# Patient Record
Sex: FEMALE | Race: WHITE | NOT HISPANIC OR LATINO | Employment: FULL TIME | URBAN - METROPOLITAN AREA
[De-identification: names, ages, dates, MRNs, and addresses within clinical notes are randomized per-mention and may not be internally consistent; named-entity substitution may affect disease eponyms.]

---

## 2020-08-07 ENCOUNTER — OFFICE VISIT (OUTPATIENT)
Dept: FAMILY MEDICINE | Facility: CLINIC | Age: 25
End: 2020-08-07
Payer: COMMERCIAL

## 2020-08-07 ENCOUNTER — PATIENT MESSAGE (OUTPATIENT)
Dept: FAMILY MEDICINE | Facility: CLINIC | Age: 25
End: 2020-08-07

## 2020-08-07 VITALS
SYSTOLIC BLOOD PRESSURE: 118 MMHG | OXYGEN SATURATION: 99 % | HEIGHT: 69 IN | DIASTOLIC BLOOD PRESSURE: 68 MMHG | HEART RATE: 101 BPM | TEMPERATURE: 98 F | BODY MASS INDEX: 28.84 KG/M2 | WEIGHT: 194.69 LBS

## 2020-08-07 DIAGNOSIS — R21 RASH DUE TO ALLERGY: Primary | ICD-10-CM

## 2020-08-07 DIAGNOSIS — T78.40XA RASH DUE TO ALLERGY: Primary | ICD-10-CM

## 2020-08-07 PROCEDURE — 99999 PR PBB SHADOW E&M-EST. PATIENT-LVL III: CPT | Mod: PBBFAC,,, | Performed by: FAMILY MEDICINE

## 2020-08-07 PROCEDURE — 3008F PR BODY MASS INDEX (BMI) DOCUMENTED: ICD-10-PCS | Mod: CPTII,S$GLB,, | Performed by: FAMILY MEDICINE

## 2020-08-07 PROCEDURE — 99204 OFFICE O/P NEW MOD 45 MIN: CPT | Mod: S$GLB,,, | Performed by: FAMILY MEDICINE

## 2020-08-07 PROCEDURE — 99999 PR PBB SHADOW E&M-EST. PATIENT-LVL III: ICD-10-PCS | Mod: PBBFAC,,, | Performed by: FAMILY MEDICINE

## 2020-08-07 PROCEDURE — 99204 PR OFFICE/OUTPT VISIT, NEW, LEVL IV, 45-59 MIN: ICD-10-PCS | Mod: S$GLB,,, | Performed by: FAMILY MEDICINE

## 2020-08-07 PROCEDURE — 3008F BODY MASS INDEX DOCD: CPT | Mod: CPTII,S$GLB,, | Performed by: FAMILY MEDICINE

## 2020-08-07 RX ORDER — FREMANEZUMAB-VFRM 225 MG/1.5ML
INJECTION SUBCUTANEOUS
COMMUNITY
Start: 2020-07-17 | End: 2024-01-17

## 2020-08-07 RX ORDER — ACETAMINOPHEN AND CODEINE PHOSPHATE 120; 12 MG/5ML; MG/5ML
SOLUTION ORAL
COMMUNITY
End: 2020-08-08

## 2020-08-07 RX ORDER — METHYLPREDNISOLONE 4 MG/1
TABLET ORAL
Qty: 1 PACKAGE | Refills: 0 | Status: SHIPPED | OUTPATIENT
Start: 2020-08-07 | End: 2020-08-28

## 2020-08-07 RX ORDER — NORTRIPTYLINE HYDROCHLORIDE 50 MG/1
CAPSULE ORAL
COMMUNITY
Start: 2017-01-01

## 2020-08-07 RX ORDER — ACETAMINOPHEN AND CODEINE PHOSPHATE 120; 12 MG/5ML; MG/5ML
SOLUTION ORAL
Status: CANCELLED | OUTPATIENT
Start: 2020-08-07

## 2020-08-07 NOTE — PROGRESS NOTES
Subjective:       Patient ID: Kavita Cho is a 25 y.o. female.    Chief Complaint: Establish Care    HPI     Ms. Cho presents to clinic today for rash. Has a history of migraines and started taking emgality injections. After her second injection she got really red around her injection area. So her neurologist switched her to ajovy. After this shot she had redness on her thigh immediately. Benadryl helps slightly.     Past Medical History:   Diagnosis Date    Asthma     Migraine        History reviewed. No pertinent surgical history.    No family history on file.    Social History     Tobacco Use    Smoking status: Never Smoker   Substance Use Topics    Alcohol Use     Frequency: Never     Binge frequency: Never    Drug use: Not on file       Social History     Substance and Sexual Activity   Sexual Activity Not on file          Current Outpatient Medications:     AJOVY SYRINGE 225 mg/1.5 mL injection, INJECT 1.5 ML INTO THE SKIN EVERY 4 WEEKS, Disp: , Rfl:     norethindrone (YAMILET-BE) 0.35 mg tablet, Yamilet-BE, Disp: , Rfl:     nortriptyline (PAMELOR) 50 MG capsule, nortriptyline 50 mg capsule  Take 1 capsule every day by oral route at bedtime., Disp: , Rfl:      Review of patient's allergies indicates:  No Known Allergies     Single    Review of Systems   Constitutional: Negative for chills and fever.   HENT: Negative for congestion and sore throat.    Eyes: Negative for visual disturbance.   Respiratory: Negative for cough and shortness of breath.    Cardiovascular: Negative for chest pain.   Gastrointestinal: Negative for abdominal pain, constipation, diarrhea, nausea and vomiting.   Genitourinary: Negative for dysuria.   Musculoskeletal: Negative for joint swelling.   Skin: Positive for rash. Negative for wound.   Neurological: Negative for dizziness and headaches.   Hematological: Does not bruise/bleed easily.           Objective:          Vitals:    08/07/20 0806   BP: 118/68   Pulse: 101   Temp: 98.4  "°F (36.9 °C)   SpO2: 99%   Weight: 88.3 kg (194 lb 10.7 oz)   Height: 5' 9" (1.753 m)       Physical Exam  Vitals signs reviewed.   Constitutional:       General: She is not in acute distress.     Appearance: Normal appearance. She is well-developed.   HENT:      Head: Normocephalic and atraumatic.      Right Ear: External ear normal.      Left Ear: External ear normal.   Eyes:      Conjunctiva/sclera: Conjunctivae normal.   Cardiovascular:      Rate and Rhythm: Normal rate and regular rhythm.      Pulses: Normal pulses.      Heart sounds: Normal heart sounds.   Pulmonary:      Effort: Pulmonary effort is normal.      Breath sounds: Normal breath sounds.   Abdominal:      General: Bowel sounds are normal.      Palpations: Abdomen is soft.      Tenderness: There is no abdominal tenderness.   Musculoskeletal: Normal range of motion.   Skin:     General: Skin is warm.      Findings: No rash.   Neurological:      General: No focal deficit present.      Mental Status: She is alert.   Psychiatric:         Mood and Affect: Mood normal.         Speech: Speech normal.         Behavior: Behavior normal. Behavior is cooperative.       Right thigh                 Assessment/Plan     Kavita was seen today for establish care.    Diagnoses and all orders for this visit:    Rash due to allergy  -     methylPREDNISolone (MEDROL DOSEPACK) 4 mg tablet; use as directed      Told to continue benadryl. Has f/u with neuro next week.    F/u on rash    Future Appointments   Date Time Provider Department Center   8/21/2020  8:30 AM [facility] [place]   8/21/2020  8:55 AM [facility] [place]   8/25/2020  9:20 AM Lela Wallis MD [facility] Wallis MD  [facility]     "

## 2020-08-08 RX ORDER — ACETAMINOPHEN AND CODEINE PHOSPHATE 120; 12 MG/5ML; MG/5ML
1 SOLUTION ORAL DAILY
Qty: 30 TABLET | Refills: 11 | Status: SHIPPED | OUTPATIENT
Start: 2020-08-08 | End: 2021-06-07

## 2020-08-13 ENCOUNTER — OFFICE VISIT (OUTPATIENT)
Dept: FAMILY MEDICINE | Facility: CLINIC | Age: 25
End: 2020-08-13
Payer: COMMERCIAL

## 2020-08-13 ENCOUNTER — TELEPHONE (OUTPATIENT)
Dept: FAMILY MEDICINE | Facility: CLINIC | Age: 25
End: 2020-08-13

## 2020-08-13 DIAGNOSIS — Z13.6 ENCOUNTER FOR LIPID SCREENING FOR CARDIOVASCULAR DISEASE: ICD-10-CM

## 2020-08-13 DIAGNOSIS — Z91.89 AT RISK FOR ALLERGIC REACTION TO MEDICATION: Primary | ICD-10-CM

## 2020-08-13 DIAGNOSIS — Z13.220 ENCOUNTER FOR LIPID SCREENING FOR CARDIOVASCULAR DISEASE: ICD-10-CM

## 2020-08-13 DIAGNOSIS — Z00.00 WELLNESS EXAMINATION: Primary | ICD-10-CM

## 2020-08-13 PROCEDURE — 99213 PR OFFICE/OUTPT VISIT, EST, LEVL III, 20-29 MIN: ICD-10-PCS | Mod: 95,,, | Performed by: FAMILY MEDICINE

## 2020-08-13 PROCEDURE — 99213 OFFICE O/P EST LOW 20 MIN: CPT | Mod: 95,,, | Performed by: FAMILY MEDICINE

## 2020-08-13 NOTE — PROGRESS NOTES
Subjective:       Patient ID: Kavita Cho is a 25 y.o. female.    Chief Complaint: Rash    HPI      The patient location is: At home in [place].   The chief complaint leading to consultation is: Rash f/u    Visit type: audiovisual    Face to Face time with patient: 20 minutes of total time spent on the encounter, which includes face to face time and non-face to face time preparing to see the patient (eg, review of tests), Obtaining and/or reviewing separately obtained history, Documenting clinical information in the electronic or other health record, Independently interpreting results (not separately reported) and communicating results to the patient/family/caregiver, or Care coordination (not separately reported).         Each patient to whom he or she provides medical services by telemedicine is:  (1) informed of the relationship between the physician and patient and the respective role of any other health care provider with respect to management of the patient; and (2) notified that he or she may decline to receive medical services by telemedicine and may withdraw from such care at any time.    Notes:     Ms. Cho presents to clinic for f/u of rash on right leg. Got a big red rash above knee after injecting it with her migraine meds (ajovy). This is the second time this has happen (previous medication that did this was (emgality). Was told to take benadryl and given a medrol dosepak. Since getting this med symptoms have gotten better but she does wonder what other allergies she may have that is causing this reaction to her migraine meds. Denies any other issues.     Past Medical History:   Diagnosis Date    Asthma     Migraine        No past surgical history on file.    Family History   Problem Relation Age of Onset    Insomnia Mother     Irregular heart beat Mother     Migraines Mother     Hypertension Father     Diabetes Maternal Aunt     Migraines Maternal Aunt     Cancer Maternal Aunt      Migraines Maternal Grandmother     Migraines Maternal Uncle        Social History     Tobacco Use    Smoking status: Never Smoker   Substance Use Topics    Alcohol Use     Frequency: Never     Drinks per session: Patient refused     Binge frequency: Never    Drug use: Not on file       Social History     Substance and Sexual Activity   Sexual Activity Not on file          Current Outpatient Medications:     AJOVY SYRINGE 225 mg/1.5 mL injection, INJECT 1.5 ML INTO THE SKIN EVERY 4 WEEKS, Disp: , Rfl:     methylPREDNISolone (MEDROL DOSEPACK) 4 mg tablet, use as directed, Disp: 1 Package, Rfl: 0    norethindrone (AGA-BE) 0.35 mg tablet, Take 1 tablet (0.35 mg total) by mouth once daily., Disp: 30 tablet, Rfl: 11    nortriptyline (PAMELOR) 50 MG capsule, nortriptyline 50 mg capsule  Take 1 capsule every day by oral route at bedtime., Disp: , Rfl:      Review of patient's allergies indicates:  No Known Allergies     Single    Review of Systems   Constitutional: Negative for chills and fever.   HENT: Negative for congestion and sore throat.    Eyes: Negative for visual disturbance.   Respiratory: Negative for cough and shortness of breath.    Cardiovascular: Negative for chest pain.   Gastrointestinal: Negative for abdominal pain, constipation, diarrhea, nausea and vomiting.   Genitourinary: Negative for dysuria.   Musculoskeletal: Negative for joint swelling.   Skin: Negative for rash and wound.   Neurological: Negative for dizziness and headaches.   Hematological: Does not bruise/bleed easily.           Objective:            Physical Exam  Constitutional:       Appearance: Normal appearance.   HENT:      Head: Normocephalic and atraumatic.   Eyes:      Conjunctiva/sclera: Conjunctivae normal.   Pulmonary:      Effort: Pulmonary effort is normal.   Skin:     Comments: Right thigh: Rash improved from when she was last seen.    Neurological:      General: No focal deficit present.      Mental Status: She is  alert.   Psychiatric:         Mood and Affect: Mood normal.         Behavior: Behavior normal.                 Assessment/Plan     Kavita was seen today for rash.    Diagnoses and all orders for this visit:    At risk for allergic reaction to medication  - Improving. Continue to monitor. Will be seeing neuro tomorrow to go over other options for migraines.   -     Ambulatory referral/consult to Allergy; Future    F/u for wellness. Wellness lab ordered in another encounter.         Lela Wallis MD  [facility]

## 2020-08-13 NOTE — Clinical Note
Lets get her to see me on august 25 at 9 am. Also needs labs done before then. Also needs referral to allergy.

## 2020-08-13 NOTE — TELEPHONE ENCOUNTER
----- Message from Lela Wallis MD sent at 8/13/2020 10:13 AM CDT -----  Lets get her to see me on august 25 at 9 am. Also needs labs done before then. Also needs referral to allergy.

## 2020-08-21 ENCOUNTER — LAB VISIT (OUTPATIENT)
Dept: LAB | Facility: HOSPITAL | Age: 25
End: 2020-08-21
Attending: FAMILY MEDICINE
Payer: COMMERCIAL

## 2020-08-21 DIAGNOSIS — Z13.220 ENCOUNTER FOR LIPID SCREENING FOR CARDIOVASCULAR DISEASE: ICD-10-CM

## 2020-08-21 DIAGNOSIS — Z13.6 ENCOUNTER FOR LIPID SCREENING FOR CARDIOVASCULAR DISEASE: ICD-10-CM

## 2020-08-21 DIAGNOSIS — Z00.00 WELLNESS EXAMINATION: ICD-10-CM

## 2020-08-21 LAB
ALBUMIN SERPL BCP-MCNC: 3.8 G/DL (ref 3.5–5.2)
ALP SERPL-CCNC: 87 U/L (ref 55–135)
ALT SERPL W/O P-5'-P-CCNC: 21 U/L (ref 10–44)
ANION GAP SERPL CALC-SCNC: 8 MMOL/L (ref 8–16)
AST SERPL-CCNC: 14 U/L (ref 10–40)
BASOPHILS # BLD AUTO: 0.04 K/UL (ref 0–0.2)
BASOPHILS NFR BLD: 0.6 % (ref 0–1.9)
BILIRUB SERPL-MCNC: 0.4 MG/DL (ref 0.1–1)
BUN SERPL-MCNC: 12 MG/DL (ref 6–20)
CALCIUM SERPL-MCNC: 9.6 MG/DL (ref 8.7–10.5)
CHLORIDE SERPL-SCNC: 104 MMOL/L (ref 95–110)
CHOLEST SERPL-MCNC: 169 MG/DL (ref 120–199)
CHOLEST/HDLC SERPL: 4.8 {RATIO} (ref 2–5)
CO2 SERPL-SCNC: 28 MMOL/L (ref 23–29)
CREAT SERPL-MCNC: 0.8 MG/DL (ref 0.5–1.4)
DIFFERENTIAL METHOD: ABNORMAL
EOSINOPHIL # BLD AUTO: 0.1 K/UL (ref 0–0.5)
EOSINOPHIL NFR BLD: 1.7 % (ref 0–8)
ERYTHROCYTE [DISTWIDTH] IN BLOOD BY AUTOMATED COUNT: 12.5 % (ref 11.5–14.5)
EST. GFR  (AFRICAN AMERICAN): >60 ML/MIN/1.73 M^2
EST. GFR  (NON AFRICAN AMERICAN): >60 ML/MIN/1.73 M^2
GLUCOSE SERPL-MCNC: 81 MG/DL (ref 70–110)
HCT VFR BLD AUTO: 46.5 % (ref 37–48.5)
HDLC SERPL-MCNC: 35 MG/DL (ref 40–75)
HDLC SERPL: 20.7 % (ref 20–50)
HGB BLD-MCNC: 14.7 G/DL (ref 12–16)
IMM GRANULOCYTES # BLD AUTO: 0.02 K/UL (ref 0–0.04)
IMM GRANULOCYTES NFR BLD AUTO: 0.3 % (ref 0–0.5)
LDLC SERPL CALC-MCNC: 110.6 MG/DL (ref 63–159)
LYMPHOCYTES # BLD AUTO: 2.7 K/UL (ref 1–4.8)
LYMPHOCYTES NFR BLD: 37.1 % (ref 18–48)
MCH RBC QN AUTO: 29.3 PG (ref 27–31)
MCHC RBC AUTO-ENTMCNC: 31.6 G/DL (ref 32–36)
MCV RBC AUTO: 93 FL (ref 82–98)
MONOCYTES # BLD AUTO: 0.5 K/UL (ref 0.3–1)
MONOCYTES NFR BLD: 7.4 % (ref 4–15)
NEUTROPHILS # BLD AUTO: 3.8 K/UL (ref 1.8–7.7)
NEUTROPHILS NFR BLD: 52.9 % (ref 38–73)
NONHDLC SERPL-MCNC: 134 MG/DL
NRBC BLD-RTO: 0 /100 WBC
PLATELET # BLD AUTO: 261 K/UL (ref 150–350)
PMV BLD AUTO: 10.1 FL (ref 9.2–12.9)
POTASSIUM SERPL-SCNC: 4.4 MMOL/L (ref 3.5–5.1)
PROT SERPL-MCNC: 7 G/DL (ref 6–8.4)
RBC # BLD AUTO: 5.01 M/UL (ref 4–5.4)
SODIUM SERPL-SCNC: 140 MMOL/L (ref 136–145)
TRIGL SERPL-MCNC: 117 MG/DL (ref 30–150)
WBC # BLD AUTO: 7.25 K/UL (ref 3.9–12.7)

## 2020-08-21 PROCEDURE — 85025 COMPLETE CBC W/AUTO DIFF WBC: CPT

## 2020-08-21 PROCEDURE — 80053 COMPREHEN METABOLIC PANEL: CPT

## 2020-08-21 PROCEDURE — 36415 COLL VENOUS BLD VENIPUNCTURE: CPT | Mod: PO

## 2020-08-21 PROCEDURE — 80061 LIPID PANEL: CPT

## 2020-08-25 ENCOUNTER — OFFICE VISIT (OUTPATIENT)
Dept: FAMILY MEDICINE | Facility: CLINIC | Age: 25
End: 2020-08-25
Payer: COMMERCIAL

## 2020-08-25 VITALS
WEIGHT: 188.94 LBS | SYSTOLIC BLOOD PRESSURE: 100 MMHG | DIASTOLIC BLOOD PRESSURE: 68 MMHG | TEMPERATURE: 98 F | BODY MASS INDEX: 27.98 KG/M2 | HEART RATE: 102 BPM | HEIGHT: 69 IN

## 2020-08-25 DIAGNOSIS — Z00.00 WELLNESS EXAMINATION: Primary | ICD-10-CM

## 2020-08-25 DIAGNOSIS — Z71.2 ENCOUNTER TO DISCUSS TEST RESULTS: ICD-10-CM

## 2020-08-25 PROCEDURE — 99999 PR PBB SHADOW E&M-EST. PATIENT-LVL III: ICD-10-PCS | Mod: PBBFAC,,, | Performed by: FAMILY MEDICINE

## 2020-08-25 PROCEDURE — 99999 PR PBB SHADOW E&M-EST. PATIENT-LVL III: CPT | Mod: PBBFAC,,, | Performed by: FAMILY MEDICINE

## 2020-08-25 PROCEDURE — 99395 PREV VISIT EST AGE 18-39: CPT | Mod: S$GLB,,, | Performed by: FAMILY MEDICINE

## 2020-08-25 PROCEDURE — 99395 PR PREVENTIVE VISIT,EST,18-39: ICD-10-PCS | Mod: S$GLB,,, | Performed by: FAMILY MEDICINE

## 2020-08-25 NOTE — PROGRESS NOTES
Subjective:       Patient ID: Kavita Cho is a 25 y.o. female.    Chief Complaint: Follow-up    HPI     Presents to clinic for her wellness.     Labs done ahead of time.     HDL a little low. Advised to try and increase exercise to 2.5 hours a week and cut back on greasy foods.     UA, cmp, cbc, ok    Exercise: Hasn't had time to. Tries to do 30 minutes of exercise twice a week.     Diet: Admits she needs to eat healthier.    Past Medical History:   Diagnosis Date    Asthma     Migraine        History reviewed. No pertinent surgical history.    Family History   Problem Relation Age of Onset    Insomnia Mother     Irregular heart beat Mother     Migraines Mother     Hypertension Father     Diabetes Maternal Aunt     Migraines Maternal Aunt     Cancer Maternal Aunt     Migraines Maternal Grandmother     Migraines Maternal Uncle        Social History     Tobacco Use    Smoking status: Never Smoker   Substance Use Topics    Alcohol Use     Frequency: Never     Drinks per session: Patient refused     Binge frequency: Never    Drug use: Not on file       Social History     Substance and Sexual Activity   Sexual Activity Not on file          Current Outpatient Medications:     norethindrone (AGA-BE) 0.35 mg tablet, Take 1 tablet (0.35 mg total) by mouth once daily., Disp: 30 tablet, Rfl: 11    nortriptyline (PAMELOR) 50 MG capsule, nortriptyline 50 mg capsule  Take 1 capsule every day by oral route at bedtime., Disp: , Rfl:     AJOVY SYRINGE 225 mg/1.5 mL injection, INJECT 1.5 ML INTO THE SKIN EVERY 4 WEEKS, Disp: , Rfl:     methylPREDNISolone (MEDROL DOSEPACK) 4 mg tablet, use as directed, Disp: 1 Package, Rfl: 0     Review of patient's allergies indicates:   Allergen Reactions    Fremanezumab-vfrm     Galcanezumab-gnlm         Single    Review of Systems   Constitutional: Negative for chills and fever.   HENT: Negative for congestion and sore throat.    Eyes: Negative for visual disturbance.  "  Respiratory: Negative for cough and shortness of breath.    Cardiovascular: Negative for chest pain.   Gastrointestinal: Negative for abdominal pain, constipation, diarrhea, nausea and vomiting.   Genitourinary: Negative for dysuria.   Musculoskeletal: Negative for joint swelling.   Skin: Negative for rash and wound.   Neurological: Negative for dizziness and headaches.   Hematological: Does not bruise/bleed easily.           Objective:          Vitals:    08/25/20 0932   BP: 100/68   Pulse: 102   Temp: 97.7 °F (36.5 °C)   TempSrc: Temporal   Weight: 85.7 kg (188 lb 15 oz)   Height: 5' 9" (1.753 m)       Physical Exam  Vitals signs reviewed.   Constitutional:       General: She is not in acute distress.     Appearance: Normal appearance. She is well-developed.   HENT:      Head: Normocephalic and atraumatic.      Right Ear: External ear normal.      Left Ear: External ear normal.   Eyes:      Conjunctiva/sclera: Conjunctivae normal.   Cardiovascular:      Rate and Rhythm: Normal rate and regular rhythm.      Pulses: Normal pulses.      Heart sounds: Normal heart sounds.   Pulmonary:      Effort: Pulmonary effort is normal.      Breath sounds: Normal breath sounds.   Abdominal:      General: Bowel sounds are normal.      Palpations: Abdomen is soft.      Tenderness: There is no abdominal tenderness.   Musculoskeletal: Normal range of motion.   Skin:     General: Skin is warm.      Findings: No rash.   Neurological:      General: No focal deficit present.      Mental Status: She is alert.   Psychiatric:         Mood and Affect: Mood normal.         Speech: Speech normal.         Behavior: Behavior normal. Behavior is cooperative.                 Assessment/Plan     Kavita was seen today for follow-up.    Diagnoses and all orders for this visit:    Wellness examination    Encounter to discuss test results      Labs discussed in clinic. Advised on diet and exercise.     Follow up in about 1 year (around 8/25/2021) for " wellness.    Future Appointments   Date Time Provider Department Center   9/3/2020  8:30 AM Marline Troncoso MD [facility] [place]           Lela Wallis MD  [facility]

## 2020-08-25 NOTE — PATIENT INSTRUCTIONS
Please send in your pap and immunization labs. Also if you had a HIV test in the past send it to me. Thanks!

## 2020-08-27 ENCOUNTER — PATIENT OUTREACH (OUTPATIENT)
Dept: ADMINISTRATIVE | Facility: OTHER | Age: 25
End: 2020-08-27

## 2020-08-27 NOTE — PROGRESS NOTES
Chart was reviewed for overdue Proactive [facility] Encounters ([place])  topics  Updates were requested from care everywhere  Health Maintenance has been updated  [facility] immunization registry triggered

## 2020-09-03 ENCOUNTER — OFFICE VISIT (OUTPATIENT)
Dept: ALLERGY | Facility: CLINIC | Age: 25
End: 2020-09-03
Payer: COMMERCIAL

## 2020-09-03 VITALS — WEIGHT: 192.88 LBS | HEIGHT: 69 IN | BODY MASS INDEX: 28.57 KG/M2 | TEMPERATURE: 97 F

## 2020-09-03 DIAGNOSIS — Z88.9 DRUG ALLERGY: Primary | ICD-10-CM

## 2020-09-03 PROCEDURE — 99999 PR PBB SHADOW E&M-EST. PATIENT-LVL III: CPT | Mod: PBBFAC,,, | Performed by: ALLERGY & IMMUNOLOGY

## 2020-09-03 PROCEDURE — 3008F PR BODY MASS INDEX (BMI) DOCUMENTED: ICD-10-PCS | Mod: CPTII,S$GLB,, | Performed by: ALLERGY & IMMUNOLOGY

## 2020-09-03 PROCEDURE — 99999 PR PBB SHADOW E&M-EST. PATIENT-LVL III: ICD-10-PCS | Mod: PBBFAC,,, | Performed by: ALLERGY & IMMUNOLOGY

## 2020-09-03 PROCEDURE — 99204 OFFICE O/P NEW MOD 45 MIN: CPT | Mod: S$GLB,,, | Performed by: ALLERGY & IMMUNOLOGY

## 2020-09-03 PROCEDURE — 3008F BODY MASS INDEX DOCD: CPT | Mod: CPTII,S$GLB,, | Performed by: ALLERGY & IMMUNOLOGY

## 2020-09-03 PROCEDURE — 99204 PR OFFICE/OUTPT VISIT, NEW, LEVL IV, 45-59 MIN: ICD-10-PCS | Mod: S$GLB,,, | Performed by: ALLERGY & IMMUNOLOGY

## 2020-09-03 RX ORDER — EPINEPHRINE 0.3 MG/.3ML
1 INJECTION SUBCUTANEOUS ONCE
Qty: 2 DEVICE | Refills: 0 | Status: SHIPPED | OUTPATIENT
Start: 2020-09-03 | End: 2024-03-05

## 2020-09-03 RX ORDER — METHYLPREDNISOLONE 4 MG/1
TABLET ORAL
Qty: 1 PACKAGE | Refills: 0 | Status: SHIPPED | OUTPATIENT
Start: 2020-09-03 | End: 2020-09-24

## 2020-09-03 NOTE — PROGRESS NOTES
"ALLERGY & IMMUNOLOGY CLINIC -  INITIAL CONSULTATION      HISTORY OF PRESENT ILLNESS     Patient ID: Kavita Cho is a 25 y.o. female    CC: medication allergy    HPI: 26 yo young lady presents for initial evaluation, Referred by Dr. Wallis for medication allergy. She has a history of very debilitating migraines, and has tried "dozens" of migraine medications in the past.    She was started on emgality injections once monthly for migraine. This provided significant relief. However, the third time she injected, there was a whelt at the site of injection that itched and spread over the next 3-4 days. She took several benadryls and symptoms improved. No breathing difficulty, no GI symptoms, no lightheadedness. No rash distant from the injection site.     Switched to ajovy monthly injections, had a similar reaction after the first injection (with more rapid onset of symptoms and more severe hive at the site of injection (~8cm, photo in Dr. Wallis's note). Again, no GI symptoms, pulmonary symptoms, or CV symptoms. She got a steroid course and symptoms improved.     She has been prescribed a third migraine medication, but it is in the same family as the emgality and ajovy (calcitonin gene-related peptide). She has not yet taken this, she is wondering whether she might react to this one as well.     She is desperate to take one of these medications because they are the only thing that alleviates / prevents her migraines. She is considering taking them even if she is allergic because she is willing to put up with hives for several days if she gets a month of migraine relief.      REVIEW OF SYSTEMS     CONST: no F/C/NS, no unintentional weight changes  NEURO: + H/A, no weakness, no paresthesias  EYES: no discharge, no pruritus, no erythema  PULM: no SOB, no wheezing, no cough  DERM: + rashes, no skin breaks     MEDICAL HISTORY     MedHx: active problems reviewed     PHYSICAL EXAM     VS: Temp 96.8 °F (36 °C)   Ht 5' 9" (1.753 " m)   Wt 87.5 kg (192 lb 14.4 oz)   BMI 28.49 kg/m²   GEN: Alert, cooperative, normal speech  HEENT: No ocular discharge, no nasal discharge, no hoarseness  PULM: Normal work of breathing, no cough  PSYCH: appropriate affect     ASSESSMENT & PLAN     Kavita Cho is a 25 y.o. female with     Urticaria at site of injection of CGRP migraine medications. Suspect she will continue to get this with all CGRP medications. Could be due to allergy or to autoantibodies to monoclonal antibody.     She desires taking the medication again despite the large local reaction. I counseled her about the risks assocaited with this, I also offered her an in-office challenge. Despite the risks, she wishes to do this trial at home. If she is determined to do this, she should premedicate with high doses of cetirizine, and have a medrol dose pack and epipen handy. I reviewed when and how to use these medications and will send these two things to her pharmacy now. If she has a dramatic reaction with the final CGRP, then I advise against taking these regularly.     While we can desensitize to any medication, in this case, unfortunately, due to the montly dosing, she would need to undergo desensitization every single month, and maintenance of the desensitized state depends on regular exposure to the drug. This is unlikely to be feasible.     Follow up: she will let me know if she undergoes a home challenge and how it went. She is also welcome to return to clinic for an in-office challenge at any time.     Marline Troncoso MD  Allergy/Immunology

## 2020-09-03 NOTE — LETTER
September 3, 2020      Lela Wallis MD  [address]  [place] [address]           [place] [facility]  [address]  [place] [address]  Phone: [phone]          Patient: Kavita Cho   MR Number: [id number]   YOB: 1995   Date of Visit: 9/3/2020       Dear Dr. Lela Wallis:    Thank you for referring Kavita Cho to me for evaluation. Attached you will find relevant portions of my assessment and plan of care.    If you have questions, please do not hesitate to call me. I look forward to following Kavita Cho along with you.    Sincerely,    Marline Troncoso MD    Enclosure  CC:  No Recipients    If you would like to receive this communication electronically, please contact [email or web address] or [phone] to request more information on [facility] [employer] access.    For providers and/or their staff who would like to refer a patient to [facility], please contact us through our one-stop-shop provider referral line, [facility] , at [phone].    If you feel you have received this communication in error or would no longer like to receive these types of communications, please e-mail [email or web address]

## 2020-10-05 ENCOUNTER — PATIENT MESSAGE (OUTPATIENT)
Dept: ADMINISTRATIVE | Facility: HOSPITAL | Age: 25
End: 2020-10-05

## 2021-01-04 ENCOUNTER — PATIENT MESSAGE (OUTPATIENT)
Dept: ADMINISTRATIVE | Facility: HOSPITAL | Age: 26
End: 2021-01-04

## 2021-03-30 ENCOUNTER — PATIENT MESSAGE (OUTPATIENT)
Dept: FAMILY MEDICINE | Facility: CLINIC | Age: 26
End: 2021-03-30

## 2021-04-05 ENCOUNTER — PATIENT MESSAGE (OUTPATIENT)
Dept: ADMINISTRATIVE | Facility: HOSPITAL | Age: 26
End: 2021-04-05

## 2021-05-04 ENCOUNTER — OFFICE VISIT (OUTPATIENT)
Dept: FAMILY MEDICINE | Facility: CLINIC | Age: 26
End: 2021-05-04
Payer: COMMERCIAL

## 2021-05-04 ENCOUNTER — PATIENT OUTREACH (OUTPATIENT)
Dept: ADMINISTRATIVE | Facility: HOSPITAL | Age: 26
End: 2021-05-04

## 2021-05-04 ENCOUNTER — PATIENT MESSAGE (OUTPATIENT)
Dept: FAMILY MEDICINE | Facility: CLINIC | Age: 26
End: 2021-05-04

## 2021-05-04 DIAGNOSIS — F41.9 ANXIETY: Primary | ICD-10-CM

## 2021-05-04 DIAGNOSIS — F42.4 COMPULSIVE SKIN PICKING: ICD-10-CM

## 2021-05-04 PROCEDURE — 99203 PR OFFICE/OUTPT VISIT, NEW, LEVL III, 30-44 MIN: ICD-10-PCS | Mod: 95,,, | Performed by: NURSE PRACTITIONER

## 2021-05-04 PROCEDURE — 99203 OFFICE O/P NEW LOW 30 MIN: CPT | Mod: 95,,, | Performed by: NURSE PRACTITIONER

## 2021-05-04 RX ORDER — ESCITALOPRAM OXALATE 10 MG/1
10 TABLET ORAL DAILY
Qty: 30 TABLET | Refills: 11 | Status: SHIPPED | OUTPATIENT
Start: 2021-05-04 | End: 2021-10-11

## 2021-05-20 ENCOUNTER — OFFICE VISIT (OUTPATIENT)
Dept: FAMILY MEDICINE | Facility: CLINIC | Age: 26
End: 2021-05-20
Payer: COMMERCIAL

## 2021-05-20 DIAGNOSIS — F41.9 ANXIETY: Primary | ICD-10-CM

## 2021-05-20 PROCEDURE — 99213 PR OFFICE/OUTPT VISIT, EST, LEVL III, 20-29 MIN: ICD-10-PCS | Mod: 95,,, | Performed by: NURSE PRACTITIONER

## 2021-05-20 PROCEDURE — 99213 OFFICE O/P EST LOW 20 MIN: CPT | Mod: 95,,, | Performed by: NURSE PRACTITIONER

## 2021-05-28 ENCOUNTER — TELEPHONE (OUTPATIENT)
Dept: FAMILY MEDICINE | Facility: CLINIC | Age: 26
End: 2021-05-28

## 2021-05-31 ENCOUNTER — OFFICE VISIT (OUTPATIENT)
Dept: FAMILY MEDICINE | Facility: CLINIC | Age: 26
End: 2021-05-31
Payer: COMMERCIAL

## 2021-05-31 DIAGNOSIS — F41.9 ANXIETY: Primary | ICD-10-CM

## 2021-05-31 PROCEDURE — 99214 PR OFFICE/OUTPT VISIT, EST, LEVL IV, 30-39 MIN: ICD-10-PCS | Mod: 95,,, | Performed by: NURSE PRACTITIONER

## 2021-05-31 PROCEDURE — 99214 OFFICE O/P EST MOD 30 MIN: CPT | Mod: 95,,, | Performed by: NURSE PRACTITIONER

## 2021-05-31 RX ORDER — SUMATRIPTAN SUCCINATE 100 MG/1
TABLET ORAL
COMMUNITY
Start: 2015-01-01

## 2021-05-31 RX ORDER — LORAZEPAM 0.5 MG/1
0.5 TABLET ORAL EVERY 12 HOURS PRN
Qty: 30 TABLET | Refills: 1 | Status: SHIPPED | OUTPATIENT
Start: 2021-05-31 | End: 2021-07-02 | Stop reason: SDUPTHER

## 2021-07-02 ENCOUNTER — OFFICE VISIT (OUTPATIENT)
Dept: FAMILY MEDICINE | Facility: CLINIC | Age: 26
End: 2021-07-02
Payer: COMMERCIAL

## 2021-07-02 DIAGNOSIS — F41.9 ANXIETY: Primary | ICD-10-CM

## 2021-07-02 PROCEDURE — 99213 PR OFFICE/OUTPT VISIT, EST, LEVL III, 20-29 MIN: ICD-10-PCS | Mod: 95,,, | Performed by: NURSE PRACTITIONER

## 2021-07-02 PROCEDURE — 99213 OFFICE O/P EST LOW 20 MIN: CPT | Mod: 95,,, | Performed by: NURSE PRACTITIONER

## 2021-07-02 RX ORDER — LORAZEPAM 0.5 MG/1
0.5 TABLET ORAL EVERY 12 HOURS PRN
Qty: 30 TABLET | Refills: 2 | Status: SHIPPED | OUTPATIENT
Start: 2021-07-02 | End: 2022-01-27

## 2021-07-07 ENCOUNTER — PATIENT MESSAGE (OUTPATIENT)
Dept: ADMINISTRATIVE | Facility: HOSPITAL | Age: 26
End: 2021-07-07

## 2021-09-15 ENCOUNTER — LAB VISIT (OUTPATIENT)
Dept: LAB | Facility: HOSPITAL | Age: 26
End: 2021-09-15
Attending: NURSE PRACTITIONER
Payer: COMMERCIAL

## 2021-09-15 ENCOUNTER — OFFICE VISIT (OUTPATIENT)
Dept: OBSTETRICS AND GYNECOLOGY | Facility: CLINIC | Age: 26
End: 2021-09-15
Payer: COMMERCIAL

## 2021-09-15 VITALS
BODY MASS INDEX: 29.89 KG/M2 | SYSTOLIC BLOOD PRESSURE: 122 MMHG | DIASTOLIC BLOOD PRESSURE: 72 MMHG | WEIGHT: 202.38 LBS

## 2021-09-15 DIAGNOSIS — Z11.3 SCREEN FOR STD (SEXUALLY TRANSMITTED DISEASE): ICD-10-CM

## 2021-09-15 DIAGNOSIS — Z01.419 ENCOUNTER FOR ANNUAL ROUTINE GYNECOLOGICAL EXAMINATION: Primary | ICD-10-CM

## 2021-09-15 DIAGNOSIS — Z12.4 SCREENING FOR MALIGNANT NEOPLASM OF CERVIX: ICD-10-CM

## 2021-09-15 PROCEDURE — 3008F BODY MASS INDEX DOCD: CPT | Mod: CPTII,S$GLB,, | Performed by: NURSE PRACTITIONER

## 2021-09-15 PROCEDURE — 87389 HIV-1 AG W/HIV-1&-2 AB AG IA: CPT | Performed by: NURSE PRACTITIONER

## 2021-09-15 PROCEDURE — 3008F PR BODY MASS INDEX (BMI) DOCUMENTED: ICD-10-PCS | Mod: CPTII,S$GLB,, | Performed by: NURSE PRACTITIONER

## 2021-09-15 PROCEDURE — 99385 PREV VISIT NEW AGE 18-39: CPT | Mod: S$GLB,,, | Performed by: NURSE PRACTITIONER

## 2021-09-15 PROCEDURE — 80074 ACUTE HEPATITIS PANEL: CPT | Performed by: NURSE PRACTITIONER

## 2021-09-15 PROCEDURE — 99999 PR PBB SHADOW E&M-EST. PATIENT-LVL III: CPT | Mod: PBBFAC,,, | Performed by: NURSE PRACTITIONER

## 2021-09-15 PROCEDURE — 99385 PR PREVENTIVE VISIT,NEW,18-39: ICD-10-PCS | Mod: S$GLB,,, | Performed by: NURSE PRACTITIONER

## 2021-09-15 PROCEDURE — 88175 CYTOPATH C/V AUTO FLUID REDO: CPT | Performed by: NURSE PRACTITIONER

## 2021-09-15 PROCEDURE — 3074F PR MOST RECENT SYSTOLIC BLOOD PRESSURE < 130 MM HG: ICD-10-PCS | Mod: CPTII,S$GLB,, | Performed by: NURSE PRACTITIONER

## 2021-09-15 PROCEDURE — 1160F RVW MEDS BY RX/DR IN RCRD: CPT | Mod: CPTII,S$GLB,, | Performed by: NURSE PRACTITIONER

## 2021-09-15 PROCEDURE — 3074F SYST BP LT 130 MM HG: CPT | Mod: CPTII,S$GLB,, | Performed by: NURSE PRACTITIONER

## 2021-09-15 PROCEDURE — 3078F PR MOST RECENT DIASTOLIC BLOOD PRESSURE < 80 MM HG: ICD-10-PCS | Mod: CPTII,S$GLB,, | Performed by: NURSE PRACTITIONER

## 2021-09-15 PROCEDURE — 87591 N.GONORRHOEAE DNA AMP PROB: CPT | Performed by: NURSE PRACTITIONER

## 2021-09-15 PROCEDURE — 3078F DIAST BP <80 MM HG: CPT | Mod: CPTII,S$GLB,, | Performed by: NURSE PRACTITIONER

## 2021-09-15 PROCEDURE — 86592 SYPHILIS TEST NON-TREP QUAL: CPT | Performed by: NURSE PRACTITIONER

## 2021-09-15 PROCEDURE — 1160F PR REVIEW ALL MEDS BY PRESCRIBER/CLIN PHARMACIST DOCUMENTED: ICD-10-PCS | Mod: CPTII,S$GLB,, | Performed by: NURSE PRACTITIONER

## 2021-09-15 PROCEDURE — 36415 COLL VENOUS BLD VENIPUNCTURE: CPT | Mod: PN | Performed by: NURSE PRACTITIONER

## 2021-09-15 PROCEDURE — 99999 PR PBB SHADOW E&M-EST. PATIENT-LVL III: ICD-10-PCS | Mod: PBBFAC,,, | Performed by: NURSE PRACTITIONER

## 2021-09-15 PROCEDURE — 1159F MED LIST DOCD IN RCRD: CPT | Mod: CPTII,S$GLB,, | Performed by: NURSE PRACTITIONER

## 2021-09-15 PROCEDURE — 1159F PR MEDICATION LIST DOCUMENTED IN MEDICAL RECORD: ICD-10-PCS | Mod: CPTII,S$GLB,, | Performed by: NURSE PRACTITIONER

## 2021-09-15 PROCEDURE — 87491 CHLMYD TRACH DNA AMP PROBE: CPT | Performed by: NURSE PRACTITIONER

## 2021-09-15 RX ORDER — UBROGEPANT 100 MG/1
100 TABLET ORAL 2 TIMES DAILY PRN
COMMUNITY
Start: 2021-08-11

## 2021-09-15 RX ORDER — ERENUMAB-AOOE 140 MG/ML
INJECTION, SOLUTION SUBCUTANEOUS
COMMUNITY
Start: 2021-08-03

## 2021-09-16 LAB
C TRACH DNA SPEC QL NAA+PROBE: NOT DETECTED
HAV IGM SERPL QL IA: NEGATIVE
HBV CORE IGM SERPL QL IA: NEGATIVE
HBV SURFACE AG SERPL QL IA: NEGATIVE
HCV AB SERPL QL IA: NEGATIVE
HIV 1+2 AB+HIV1 P24 AG SERPL QL IA: NEGATIVE
N GONORRHOEA DNA SPEC QL NAA+PROBE: NOT DETECTED
RPR SER QL: NORMAL

## 2021-09-20 LAB
FINAL PATHOLOGIC DIAGNOSIS: NORMAL
Lab: NORMAL

## 2021-11-30 ENCOUNTER — LAB VISIT (OUTPATIENT)
Dept: LAB | Facility: HOSPITAL | Age: 26
End: 2021-11-30
Payer: COMMERCIAL

## 2021-11-30 ENCOUNTER — OFFICE VISIT (OUTPATIENT)
Dept: FAMILY MEDICINE | Facility: CLINIC | Age: 26
End: 2021-11-30
Payer: COMMERCIAL

## 2021-11-30 DIAGNOSIS — R39.9 UTI SYMPTOMS: ICD-10-CM

## 2021-11-30 DIAGNOSIS — R39.9 UTI SYMPTOMS: Primary | ICD-10-CM

## 2021-11-30 LAB
BACTERIA #/AREA URNS AUTO: ABNORMAL /HPF
BILIRUB UR QL STRIP: NEGATIVE
CLARITY UR REFRACT.AUTO: ABNORMAL
COLOR UR AUTO: YELLOW
GLUCOSE UR QL STRIP: NEGATIVE
HGB UR QL STRIP: ABNORMAL
KETONES UR QL STRIP: NEGATIVE
LEUKOCYTE ESTERASE UR QL STRIP: ABNORMAL
MICROSCOPIC COMMENT: ABNORMAL
NITRITE UR QL STRIP: NEGATIVE
PH UR STRIP: 6 [PH] (ref 5–8)
PROT UR QL STRIP: NEGATIVE
RBC #/AREA URNS AUTO: 5 /HPF (ref 0–4)
SP GR UR STRIP: 1.01 (ref 1–1.03)
SQUAMOUS #/AREA URNS AUTO: 2 /HPF
URN SPEC COLLECT METH UR: ABNORMAL
WBC #/AREA URNS AUTO: 11 /HPF (ref 0–5)

## 2021-11-30 PROCEDURE — 87086 URINE CULTURE/COLONY COUNT: CPT | Performed by: NURSE PRACTITIONER

## 2021-11-30 PROCEDURE — 87077 CULTURE AEROBIC IDENTIFY: CPT | Performed by: NURSE PRACTITIONER

## 2021-11-30 PROCEDURE — 81001 URINALYSIS AUTO W/SCOPE: CPT | Performed by: NURSE PRACTITIONER

## 2021-11-30 PROCEDURE — 87186 SC STD MICRODIL/AGAR DIL: CPT | Performed by: NURSE PRACTITIONER

## 2021-11-30 PROCEDURE — 99214 OFFICE O/P EST MOD 30 MIN: CPT | Mod: 95,,, | Performed by: NURSE PRACTITIONER

## 2021-11-30 PROCEDURE — 99214 PR OFFICE/OUTPT VISIT, EST, LEVL IV, 30-39 MIN: ICD-10-PCS | Mod: 95,,, | Performed by: NURSE PRACTITIONER

## 2021-11-30 PROCEDURE — 87088 URINE BACTERIA CULTURE: CPT | Performed by: NURSE PRACTITIONER

## 2021-11-30 RX ORDER — DOXYCYCLINE 100 MG/1
100 CAPSULE ORAL 2 TIMES DAILY
Qty: 10 CAPSULE | Refills: 0 | Status: SHIPPED | OUTPATIENT
Start: 2021-11-30 | End: 2022-02-28 | Stop reason: ALTCHOICE

## 2021-12-02 LAB — BACTERIA UR CULT: ABNORMAL

## 2021-12-03 ENCOUNTER — PATIENT MESSAGE (OUTPATIENT)
Dept: FAMILY MEDICINE | Facility: CLINIC | Age: 26
End: 2021-12-03
Payer: COMMERCIAL

## 2022-02-28 ENCOUNTER — LAB VISIT (OUTPATIENT)
Dept: LAB | Facility: OTHER | Age: 27
End: 2022-02-28
Attending: PHYSICIAN ASSISTANT
Payer: COMMERCIAL

## 2022-02-28 ENCOUNTER — OFFICE VISIT (OUTPATIENT)
Dept: INTERNAL MEDICINE | Facility: CLINIC | Age: 27
End: 2022-02-28
Payer: COMMERCIAL

## 2022-02-28 VITALS
DIASTOLIC BLOOD PRESSURE: 84 MMHG | HEART RATE: 109 BPM | SYSTOLIC BLOOD PRESSURE: 118 MMHG | WEIGHT: 210.13 LBS | OXYGEN SATURATION: 100 % | BODY MASS INDEX: 31.03 KG/M2

## 2022-02-28 DIAGNOSIS — R30.0 DYSURIA: Primary | ICD-10-CM

## 2022-02-28 DIAGNOSIS — R30.0 DYSURIA: ICD-10-CM

## 2022-02-28 LAB
BACTERIA #/AREA URNS HPF: ABNORMAL /HPF
BILIRUB UR QL STRIP: NEGATIVE
CLARITY UR: ABNORMAL
COLOR UR: YELLOW
GLUCOSE UR QL STRIP: NEGATIVE
HGB UR QL STRIP: ABNORMAL
KETONES UR QL STRIP: NEGATIVE
LEUKOCYTE ESTERASE UR QL STRIP: ABNORMAL
MICROSCOPIC COMMENT: ABNORMAL
NITRITE UR QL STRIP: NEGATIVE
NON-SQ EPI CELLS #/AREA URNS HPF: 2 /HPF
PH UR STRIP: 7 [PH] (ref 5–8)
PROT UR QL STRIP: NEGATIVE
RBC #/AREA URNS HPF: 40 /HPF (ref 0–4)
SP GR UR STRIP: 1.01 (ref 1–1.03)
SQUAMOUS #/AREA URNS HPF: 3 /HPF
URN SPEC COLLECT METH UR: ABNORMAL
UROBILINOGEN UR STRIP-ACNC: NEGATIVE EU/DL
WBC #/AREA URNS HPF: >100 /HPF (ref 0–5)
WBC CLUMPS URNS QL MICRO: ABNORMAL

## 2022-02-28 PROCEDURE — 3079F PR MOST RECENT DIASTOLIC BLOOD PRESSURE 80-89 MM HG: ICD-10-PCS | Mod: CPTII,S$GLB,, | Performed by: PHYSICIAN ASSISTANT

## 2022-02-28 PROCEDURE — 3008F BODY MASS INDEX DOCD: CPT | Mod: CPTII,S$GLB,, | Performed by: PHYSICIAN ASSISTANT

## 2022-02-28 PROCEDURE — 1159F MED LIST DOCD IN RCRD: CPT | Mod: CPTII,S$GLB,, | Performed by: PHYSICIAN ASSISTANT

## 2022-02-28 PROCEDURE — 81000 URINALYSIS NONAUTO W/SCOPE: CPT | Performed by: PHYSICIAN ASSISTANT

## 2022-02-28 PROCEDURE — 99999 PR PBB SHADOW E&M-EST. PATIENT-LVL III: ICD-10-PCS | Mod: PBBFAC,,, | Performed by: PHYSICIAN ASSISTANT

## 2022-02-28 PROCEDURE — 3079F DIAST BP 80-89 MM HG: CPT | Mod: CPTII,S$GLB,, | Performed by: PHYSICIAN ASSISTANT

## 2022-02-28 PROCEDURE — 1159F PR MEDICATION LIST DOCUMENTED IN MEDICAL RECORD: ICD-10-PCS | Mod: CPTII,S$GLB,, | Performed by: PHYSICIAN ASSISTANT

## 2022-02-28 PROCEDURE — 3074F SYST BP LT 130 MM HG: CPT | Mod: CPTII,S$GLB,, | Performed by: PHYSICIAN ASSISTANT

## 2022-02-28 PROCEDURE — 3008F PR BODY MASS INDEX (BMI) DOCUMENTED: ICD-10-PCS | Mod: CPTII,S$GLB,, | Performed by: PHYSICIAN ASSISTANT

## 2022-02-28 PROCEDURE — 87086 URINE CULTURE/COLONY COUNT: CPT | Performed by: PHYSICIAN ASSISTANT

## 2022-02-28 PROCEDURE — 3074F PR MOST RECENT SYSTOLIC BLOOD PRESSURE < 130 MM HG: ICD-10-PCS | Mod: CPTII,S$GLB,, | Performed by: PHYSICIAN ASSISTANT

## 2022-02-28 PROCEDURE — 87088 URINE BACTERIA CULTURE: CPT | Performed by: PHYSICIAN ASSISTANT

## 2022-02-28 PROCEDURE — 99214 PR OFFICE/OUTPT VISIT, EST, LEVL IV, 30-39 MIN: ICD-10-PCS | Mod: S$GLB,,, | Performed by: PHYSICIAN ASSISTANT

## 2022-02-28 PROCEDURE — 87186 SC STD MICRODIL/AGAR DIL: CPT | Performed by: PHYSICIAN ASSISTANT

## 2022-02-28 PROCEDURE — 87077 CULTURE AEROBIC IDENTIFY: CPT | Performed by: PHYSICIAN ASSISTANT

## 2022-02-28 PROCEDURE — 1160F RVW MEDS BY RX/DR IN RCRD: CPT | Mod: CPTII,S$GLB,, | Performed by: PHYSICIAN ASSISTANT

## 2022-02-28 PROCEDURE — 99214 OFFICE O/P EST MOD 30 MIN: CPT | Mod: S$GLB,,, | Performed by: PHYSICIAN ASSISTANT

## 2022-02-28 PROCEDURE — 99999 PR PBB SHADOW E&M-EST. PATIENT-LVL III: CPT | Mod: PBBFAC,,, | Performed by: PHYSICIAN ASSISTANT

## 2022-02-28 PROCEDURE — 1160F PR REVIEW ALL MEDS BY PRESCRIBER/CLIN PHARMACIST DOCUMENTED: ICD-10-PCS | Mod: CPTII,S$GLB,, | Performed by: PHYSICIAN ASSISTANT

## 2022-02-28 RX ORDER — PHENAZOPYRIDINE HYDROCHLORIDE 100 MG/1
100 TABLET, FILM COATED ORAL 3 TIMES DAILY PRN
Qty: 9 TABLET | Refills: 0 | Status: SHIPPED | OUTPATIENT
Start: 2022-02-28 | End: 2022-03-03

## 2022-02-28 RX ORDER — DOXYCYCLINE 100 MG/1
100 CAPSULE ORAL 2 TIMES DAILY
Qty: 14 CAPSULE | Refills: 0 | Status: SHIPPED | OUTPATIENT
Start: 2022-02-28 | End: 2022-03-07

## 2022-02-28 NOTE — PROGRESS NOTES
INTERNAL MEDICINE URGENT VISIT NOTE    CHIEF COMPLAINT     Chief Complaint   Patient presents with    Dysuria       HPI     Kavita Cho is a 26 y.o. female who presents for an urgent visit today.    PCP is Lela Wallis MD (Inactive), patient is new to me.   Two days of UTI symptoms -buring, frequency urgerncy   No blood  No back pain  No fever no nausea or vomiting  Last UTI was 2021 - treated with doxy did well.   No history of pyelonephritis    Past Medical History:  Past Medical History:   Diagnosis Date    Asthma     Migraine        Home Medications:  Prior to Admission medications    Medication Sig Start Date End Date Taking? Authorizing Provider   AIMOVIG AUTOINJECTOR 140 mg/mL autoinjector SMARTSI Milliliter(s) SUB-Q Every 4 Weeks 8/3/21   Historical Provider   AJOVY SYRINGE 225 mg/1.5 mL injection INJECT 1.5 ML INTO THE SKIN EVERY 4 WEEKS 20   Historical Provider   doxycycline (MONODOX) 100 MG capsule Take 1 capsule (100 mg total) by mouth 2 (two) times daily. 21   Redd Roach NP   EPINEPHrine (EPIPEN 2-GARCÍA) 0.3 mg/0.3 mL AtIn Inject 0.3 mLs (0.3 mg total) into the muscle once. for 1 dose 9/3/20 9/3/20  Marline Troncoso MD   COLBY 0.35 mg tablet TAKE 1 TABLET BY MOUTH EVERY DAY 21   Lela Wallis MD   EScitalopram oxalate (LEXAPRO) 10 MG tablet TAKE 1 TABLET BY MOUTH EVERY DAY 10/11/21   Redd Roach NP   LORazepam (ATIVAN) 0.5 MG tablet TAKE 1 TABLET (0.5 MG TOTAL) BY MOUTH EVERY 12 (TWELVE) HOURS AS NEEDED FOR ANXIETY. 22   Redd Roach NP   nortriptyline (PAMELOR) 50 MG capsule nortriptyline 50 mg capsule   Take 1 capsule every day by oral route at bedtime. 17   Historical Provider   sumatriptan (IMITREX) 100 MG tablet sumatriptan 100 mg tablet   TAKE 1 TABLET BY MOUTH AT ONSET OF HEADACHE, MAY REPEAT 2 HOURS LATER. MAX DOSE 2 TABS/24HOURS 1/1/15   Historical Provider   UBROGEPANT 100 mg tablet Take 100 mg by mouth 2 (two) times daily as  needed. 8/11/21   Historical Provider       Review of Systems:  Review of Systems   Constitutional: Negative for chills and fever.   HENT: Negative for facial swelling, sinus pressure, sore throat and trouble swallowing.    Eyes: Negative for visual disturbance.   Respiratory: Negative for cough and shortness of breath.    Cardiovascular: Negative for chest pain.   Gastrointestinal: Negative for abdominal pain, constipation, diarrhea, nausea and vomiting.   Genitourinary: Positive for dysuria, frequency and urgency. Negative for flank pain, menstrual problem, pelvic pain, vaginal bleeding and vaginal discharge.   Musculoskeletal: Negative for arthralgias, back pain, neck pain and neck stiffness.   Skin: Negative for rash.   Neurological: Negative for dizziness, syncope, weakness and headaches.   Psychiatric/Behavioral: Negative for confusion.       Health Maintainence:   Immunizations:  Health Maintenance       Date Due Completion Date    COVID-19 Vaccine (1) Never done ---    HPV Vaccines (1 - 2-dose series) Never done ---    TETANUS VACCINE Never done ---    Influenza Vaccine (1) Never done ---    Chlamydia Screening 09/15/2022 9/15/2021    Pap Smear 09/15/2024 9/15/2021           PHYSICAL EXAM     /84 (BP Location: Right arm, Patient Position: Sitting)   Pulse 109   Wt 95.3 kg (210 lb 1.6 oz)   SpO2 100%   BMI 31.03 kg/m²     Physical Exam  Vitals and nursing note reviewed.   Constitutional:       Appearance: Normal appearance.      Comments: Healthy appearing female in NAD or apparent pain. She makes good eye contact, speaks in clear full sentences and ambulates with ease.      HENT:      Head: Normocephalic and atraumatic.      Nose: Nose normal.      Mouth/Throat:      Pharynx: Oropharynx is clear.   Eyes:      Conjunctiva/sclera: Conjunctivae normal.   Cardiovascular:      Rate and Rhythm: Normal rate and regular rhythm.      Pulses: Normal pulses.   Pulmonary:      Effort: No respiratory distress.    Abdominal:      Tenderness: There is no abdominal tenderness.   Musculoskeletal:         General: Normal range of motion.      Cervical back: No rigidity.   Skin:     General: Skin is warm and dry.      Capillary Refill: Capillary refill takes less than 2 seconds.      Findings: No rash.   Neurological:      General: No focal deficit present.      Mental Status: She is alert.      Gait: Gait normal.   Psychiatric:         Mood and Affect: Mood normal.         LABS     No results found for: LABA1C, HGBA1C  CMP  Sodium   Date Value Ref Range Status   08/21/2020 140 136 - 145 mmol/L Final     Potassium   Date Value Ref Range Status   08/21/2020 4.4 3.5 - 5.1 mmol/L Final     Chloride   Date Value Ref Range Status   08/21/2020 104 95 - 110 mmol/L Final     CO2   Date Value Ref Range Status   08/21/2020 28 23 - 29 mmol/L Final     Glucose   Date Value Ref Range Status   08/21/2020 81 70 - 110 mg/dL Final     BUN   Date Value Ref Range Status   08/21/2020 12 6 - 20 mg/dL Final     Creatinine   Date Value Ref Range Status   08/21/2020 0.8 0.5 - 1.4 mg/dL Final     Calcium   Date Value Ref Range Status   08/21/2020 9.6 8.7 - 10.5 mg/dL Final     Total Protein   Date Value Ref Range Status   08/21/2020 7.0 6.0 - 8.4 g/dL Final     Albumin   Date Value Ref Range Status   08/21/2020 3.8 3.5 - 5.2 g/dL Final     Total Bilirubin   Date Value Ref Range Status   08/21/2020 0.4 0.1 - 1.0 mg/dL Final     Comment:     For infants and newborns, interpretation of results should be based  on gestational age, weight and in agreement with clinical  observations.  Premature Infant recommended reference ranges:  Up to 24 hours.............<8.0 mg/dL  Up to 48 hours............<12.0 mg/dL  3-5 days..................<15.0 mg/dL  6-29 days.................<15.0 mg/dL       Alkaline Phosphatase   Date Value Ref Range Status   08/21/2020 87 55 - 135 U/L Final     AST   Date Value Ref Range Status   08/21/2020 14 10 - 40 U/L Final     ALT    Date Value Ref Range Status   08/21/2020 21 10 - 44 U/L Final     Anion Gap   Date Value Ref Range Status   08/21/2020 8 8 - 16 mmol/L Final     eGFR if    Date Value Ref Range Status   08/21/2020 >60.0 >60 mL/min/1.73 m^2 Final     eGFR if non    Date Value Ref Range Status   08/21/2020 >60.0 >60 mL/min/1.73 m^2 Final     Comment:     Calculation used to obtain the estimated glomerular filtration  rate (eGFR) is the CKD-EPI equation.        Lab Results   Component Value Date    WBC 7.25 08/21/2020    HGB 14.7 08/21/2020    HCT 46.5 08/21/2020    MCV 93 08/21/2020     08/21/2020     Lab Results   Component Value Date    CHOL 169 08/21/2020     Lab Results   Component Value Date    HDL 35 (L) 08/21/2020     Lab Results   Component Value Date    LDLCALC 110.6 08/21/2020     Lab Results   Component Value Date    TRIG 117 08/21/2020     Lab Results   Component Value Date    CHOLHDL 20.7 08/21/2020     No results found for: TSH, A6TCYSE, U5MKSPK, THYROIDAB    ASSESSMENT/PLAN     Kavita Cho is a 26 y.o. female     Kavita was seen today for dysuria. Will treat empirically for UTI with Doxycycline -tolerated well in the recent past when prescribed to treat UTI. Will send UCx and also prescribe pyridium for symptom mgmt. She is aware of ED prompts.     Diagnoses and all orders for this visit:    Dysuria  -     URINALYSIS; Future  -     Urine culture; Future    Other orders  -     doxycycline (MONODOX) 100 MG capsule; Take 1 capsule (100 mg total) by mouth 2 (two) times daily. for 7 days  -     phenazopyridine (PYRIDIUM) 100 MG tablet; Take 1 tablet (100 mg total) by mouth 3 (three) times daily as needed for Pain.         Patient was counseled on when and how to seek emergent care.       Cat Olsen PA-C   Department of Internal Medicine - [facility]   7:31 AM

## 2022-03-02 LAB — BACTERIA UR CULT: ABNORMAL

## 2022-03-03 RX ORDER — CEPHALEXIN 500 MG/1
500 CAPSULE ORAL EVERY 6 HOURS
Qty: 28 CAPSULE | Refills: 0 | Status: SHIPPED | OUTPATIENT
Start: 2022-03-03 | End: 2022-03-10

## 2022-05-17 RX ORDER — ACETAMINOPHEN AND CODEINE PHOSPHATE 120; 12 MG/5ML; MG/5ML
1 SOLUTION ORAL DAILY
Qty: 84 TABLET | Refills: 3 | Status: SHIPPED | OUTPATIENT
Start: 2022-05-17 | End: 2023-04-18

## 2022-06-04 ENCOUNTER — HOSPITAL ENCOUNTER (EMERGENCY)
Facility: OTHER | Age: 27
Discharge: HOME | End: 2022-06-04
Attending: EMERGENCY MEDICINE
Payer: COMMERCIAL

## 2022-06-04 VITALS
HEIGHT: 70 IN | TEMPERATURE: 98 F | RESPIRATION RATE: 15 BRPM | BODY MASS INDEX: 28.63 KG/M2 | WEIGHT: 200 LBS | DIASTOLIC BLOOD PRESSURE: 92 MMHG | HEART RATE: 97 BPM | OXYGEN SATURATION: 100 % | SYSTOLIC BLOOD PRESSURE: 138 MMHG

## 2022-06-04 DIAGNOSIS — N20.0 KIDNEY STONE ON RIGHT SIDE: ICD-10-CM

## 2022-06-04 DIAGNOSIS — R10.31 RIGHT LOWER QUADRANT ABDOMINAL PAIN: Primary | ICD-10-CM

## 2022-06-04 LAB
ANION GAP SERPL CALC-SCNC: 16 MMOL/L (ref 8–16)
B-HCG UR QL: NEGATIVE
BACTERIA #/AREA URNS HPF: ABNORMAL /HPF
BASOPHILS # BLD AUTO: 0.04 K/UL (ref 0–0.2)
BASOPHILS NFR BLD: 0.3 % (ref 0–1.9)
BILIRUB UR QL STRIP: NEGATIVE
BUN SERPL-MCNC: 13 MG/DL (ref 6–20)
CALCIUM SERPL-MCNC: 9.7 MG/DL (ref 8.7–10.5)
CHLORIDE SERPL-SCNC: 100 MMOL/L (ref 95–110)
CLARITY UR: CLEAR
CO2 SERPL-SCNC: 20 MMOL/L (ref 23–29)
COLOR UR: YELLOW
CREAT SERPL-MCNC: 0.9 MG/DL (ref 0.5–1.4)
CTP QC/QA: YES
DIFFERENTIAL METHOD: ABNORMAL
EOSINOPHIL # BLD AUTO: 0 K/UL (ref 0–0.5)
EOSINOPHIL NFR BLD: 0.2 % (ref 0–8)
ERYTHROCYTE [DISTWIDTH] IN BLOOD BY AUTOMATED COUNT: 12.4 % (ref 11.5–14.5)
EST. GFR  (AFRICAN AMERICAN): >60 ML/MIN/1.73 M^2
EST. GFR  (NON AFRICAN AMERICAN): >60 ML/MIN/1.73 M^2
GLUCOSE SERPL-MCNC: 102 MG/DL (ref 70–110)
GLUCOSE UR QL STRIP: NEGATIVE
HCT VFR BLD AUTO: 46.4 % (ref 37–48.5)
HCV AB SERPL QL IA: NEGATIVE
HGB BLD-MCNC: 16.1 G/DL (ref 12–16)
HGB UR QL STRIP: ABNORMAL
HIV 1+2 AB+HIV1 P24 AG SERPL QL IA: NEGATIVE
IMM GRANULOCYTES # BLD AUTO: 0.07 K/UL (ref 0–0.04)
IMM GRANULOCYTES NFR BLD AUTO: 0.5 % (ref 0–0.5)
KETONES UR QL STRIP: ABNORMAL
LEUKOCYTE ESTERASE UR QL STRIP: NEGATIVE
LYMPHOCYTES # BLD AUTO: 1.9 K/UL (ref 1–4.8)
LYMPHOCYTES NFR BLD: 13.2 % (ref 18–48)
MCH RBC QN AUTO: 29.8 PG (ref 27–31)
MCHC RBC AUTO-ENTMCNC: 34.7 G/DL (ref 32–36)
MCV RBC AUTO: 86 FL (ref 82–98)
MICROSCOPIC COMMENT: ABNORMAL
MONOCYTES # BLD AUTO: 0.9 K/UL (ref 0.3–1)
MONOCYTES NFR BLD: 6.4 % (ref 4–15)
NEUTROPHILS # BLD AUTO: 11.5 K/UL (ref 1.8–7.7)
NEUTROPHILS NFR BLD: 79.4 % (ref 38–73)
NITRITE UR QL STRIP: NEGATIVE
NRBC BLD-RTO: 0 /100 WBC
PH UR STRIP: 7 [PH] (ref 5–8)
PLATELET # BLD AUTO: 241 K/UL (ref 150–450)
PLATELET BLD QL SMEAR: ABNORMAL
PMV BLD AUTO: 10.4 FL (ref 9.2–12.9)
POTASSIUM SERPL-SCNC: 4.7 MMOL/L (ref 3.5–5.1)
PROT UR QL STRIP: NEGATIVE
RBC # BLD AUTO: 5.4 M/UL (ref 4–5.4)
RBC #/AREA URNS HPF: 15 /HPF (ref 0–4)
SODIUM SERPL-SCNC: 136 MMOL/L (ref 136–145)
SP GR UR STRIP: 1.01 (ref 1–1.03)
SQUAMOUS #/AREA URNS HPF: 11 /HPF
URN SPEC COLLECT METH UR: ABNORMAL
UROBILINOGEN UR STRIP-ACNC: NEGATIVE EU/DL
WBC # BLD AUTO: 14.42 K/UL (ref 3.9–12.7)
WBC #/AREA URNS HPF: 11 /HPF (ref 0–5)

## 2022-06-04 PROCEDURE — 87389 HIV-1 AG W/HIV-1&-2 AB AG IA: CPT | Performed by: EMERGENCY MEDICINE

## 2022-06-04 PROCEDURE — 81000 URINALYSIS NONAUTO W/SCOPE: CPT | Performed by: EMERGENCY MEDICINE

## 2022-06-04 PROCEDURE — 99285 EMERGENCY DEPT VISIT HI MDM: CPT | Mod: 25

## 2022-06-04 PROCEDURE — 96360 HYDRATION IV INFUSION INIT: CPT

## 2022-06-04 PROCEDURE — 86803 HEPATITIS C AB TEST: CPT | Performed by: EMERGENCY MEDICINE

## 2022-06-04 PROCEDURE — 80048 BASIC METABOLIC PNL TOTAL CA: CPT | Performed by: EMERGENCY MEDICINE

## 2022-06-04 PROCEDURE — 87086 URINE CULTURE/COLONY COUNT: CPT | Performed by: EMERGENCY MEDICINE

## 2022-06-04 PROCEDURE — 25000003 PHARM REV CODE 250: Performed by: EMERGENCY MEDICINE

## 2022-06-04 PROCEDURE — 96361 HYDRATE IV INFUSION ADD-ON: CPT

## 2022-06-04 PROCEDURE — 85025 COMPLETE CBC W/AUTO DIFF WBC: CPT | Performed by: EMERGENCY MEDICINE

## 2022-06-04 PROCEDURE — 81025 URINE PREGNANCY TEST: CPT | Performed by: EMERGENCY MEDICINE

## 2022-06-04 RX ORDER — HYDROCODONE BITARTRATE AND ACETAMINOPHEN 5; 325 MG/1; MG/1
1 TABLET ORAL EVERY 4 HOURS PRN
Qty: 10 TABLET | Refills: 0 | Status: SHIPPED | OUTPATIENT
Start: 2022-06-04 | End: 2022-06-07

## 2022-06-04 RX ORDER — TAMSULOSIN HYDROCHLORIDE 0.4 MG/1
0.4 CAPSULE ORAL DAILY
Qty: 30 CAPSULE | Refills: 0 | Status: SHIPPED | OUTPATIENT
Start: 2022-06-04 | End: 2022-08-29

## 2022-06-04 RX ORDER — ONDANSETRON 4 MG/1
4 TABLET, ORALLY DISINTEGRATING ORAL EVERY 6 HOURS PRN
Qty: 12 TABLET | Refills: 0 | Status: SHIPPED | OUTPATIENT
Start: 2022-06-04 | End: 2022-08-29

## 2022-06-04 RX ORDER — TAMSULOSIN HYDROCHLORIDE 0.4 MG/1
0.4 CAPSULE ORAL
Status: COMPLETED | OUTPATIENT
Start: 2022-06-04 | End: 2022-06-04

## 2022-06-04 RX ORDER — NAPROXEN 500 MG/1
500 TABLET ORAL 2 TIMES DAILY PRN
Qty: 60 TABLET | Refills: 0 | Status: SHIPPED | OUTPATIENT
Start: 2022-06-04

## 2022-06-04 RX ADMIN — TAMSULOSIN HYDROCHLORIDE 0.4 MG: 0.4 CAPSULE ORAL at 09:06

## 2022-06-04 RX ADMIN — SODIUM CHLORIDE 1000 ML: 0.9 INJECTION, SOLUTION INTRAVENOUS at 07:06

## 2022-06-04 NOTE — ED TRIAGE NOTES
Pt presents to the ED c/o abdominal pain. Pt reports sharp pain in the RLQ beginning around 20:30 yesterday. Reports taking ibuprofen with little relief. Reports nausea. Denies vomiting or diarrhea. Denies any other complaints at this time. AAOx4

## 2022-06-04 NOTE — ED PROVIDER NOTES
"     Source of History:  The patient    Chief complaint:  Abdominal Pain (Pt c/o right lower region abdominal pain x 8 hours. Pt reports "sharp," + nausea - vomiting. No rebound tenderness noted in Triage. )      HPI:  Kavita Cho is a 27 y.o. female presenting with gradual onset of right lower quadrant abdominal pain that started about hours ago.  Describes a sharp pain with some nausea.  No vomiting.  Denies any diarrhea.  No fevers or chills.  Never had this before.  Denies any dysuria.    This is the extent to the patients complaints today here in the emergency department.    ROS: As per HPI and below:  Constitutional: No fever.  No chills.  Eyes: No visual changes.  ENT: No sore throat. No ear pain    Cardiovascular: No chest pain.  Respiratory: No shortness of breath.  GI:  Positive for right lower quadrant abdominal pain and nausea  Genitourinary: No abnormal urination.  Neurologic: No headache. No focal weakness.  No numbness.  MSK: no back pain.  Integument: No rashes or lesions.  Hematologic: No easy bruising.  Endocrine: No excessive thirst or urination.    Review of patient's allergies indicates:   Allergen Reactions    Fremanezumab-vfrm     Galcanezumab-gnlm        PMH:  As per HPI and below:  Past Medical History:   Diagnosis Date    Asthma     Migraine      History reviewed. No pertinent surgical history.    Social History     Tobacco Use    Smoking status: Never Smoker    Smokeless tobacco: Never Used   Substance Use Topics    Alcohol use: Not Currently    Drug use: Never       Physical Exam:    BP (!) 138/92   Pulse 97   Temp 98.2 °F (36.8 °C)   Resp 15   Ht 5' 10" (1.778 m)   Wt 90.7 kg (200 lb)   SpO2 100%   BMI 28.70 kg/m²   Nursing note and vital signs reviewed.  Constitutional: No acute distress.  Nontoxic  Eyes: No conjunctival injection.  Extraocular muscles are intact.  ENT: Oropharynx clear.  Normal phonation.  Cardiovascular: Regular rate and rhythm.  No murmurs. No " gallops. No rubs  Respiratory: Clear to auscultation bilaterally.  Good air movement.  No wheezes.  No rhonchi. No rales. No accessory muscle use.  Abdomen:  Soft, nontender nondistended.  No noted pain to deep palpation, guarding or rebound in the abdominal region specifically the right lower quadrant at McBurney's point.  Negative Leonard sign.  Non peritoneal.  Musculoskeletal: Good range of motion all joints.  No deformities.  Neck supple.  No meningismus.  Skin: No rashes seen.  Good turgor.  No abrasions.  No ecchymoses.  Neuro: alert and oriented x3,  no focal neurological deficits.  Psych: Appropriate, conversant    Labs that have been ordered have been independently reviewed and interpreted by myself.    I decided to obtain the patient's medical records.  Summary of Medical Records:      MDM/ Differential Dx:   27 y.o. female with right lower quadrant abdominal pain.  Considered but do not suspect ovarian torsion.  History is inconsistent with urinary tract infection or renal colic.  Will check a pregnancy test to rule out ectopic.  Patient's pain is near McBurney's point in the right lower quadrant however her examination is very unremarkable.  Will get basic blood work as well as UPT and urinalysis.  I feel that her examination is benign at this time and have a low enough suspicion that I would hold off on a CT scan and just observed.  Will re-evaluate after lab results return.  Be signing out to Dr. Willoughby for re-evaluation.  I have already discussed with the patient that if the symptoms do not worsen but improve I feel it is reasonable to discharge with follow-up in 12-24 hours for re-evaluation if abdominal pain then worsens.  Patient understands and agrees with this plan of care.    ED Course as of 06/08/22 1822   Sat Jun 04, 2022   0703 [place] of Care Note:  Discussed pt and plan with prior team.   I independently reviewed and interpreted labs which are notable for leukocytosis, polycythemia, 11  WBC/hpf and hematuria.     [RC]   0902 I independently reviewed and interpreted CT abdomen/pelvis, notable for no free air, fluid collection.  There is mild hydroureter and hydronephrosis associated with obstructing 2 mm calculus distal ureter in the vicinity of the UVJ. [RC]      ED Course User Index  [RC] Edward Willoughby MD               Diagnostic Impression:    1. Right lower quadrant abdominal pain    2. Kidney stone on right side         ED Disposition Condition    Discharge Good          ED Prescriptions     Medication Sig Dispense Start Date End Date Auth. Provider    tamsulosin (FLOMAX) 0.4 mg Cap Take 1 capsule (0.4 mg total) by mouth once daily. 30 capsule 2022 Edward Willoughby MD    naproxen (NAPROSYN) 500 MG tablet Take 1 tablet (500 mg total) by mouth 2 (two) times daily as needed (pain). 60 tablet 2022  Edward Willoughby MD    ondansetron (ZOFRAN-ODT) 4 MG TbDL Take 1 tablet (4 mg total) by mouth every 6 (six) hours as needed (nausea or vomiting). 12 tablet 2022  Edward Willoughby MD    HYDROcodone-acetaminophen (NORCO) 5-325 mg per tablet () Take 1 tablet by mouth every 4 (four) hours as needed (severe pain not improved by other measures.). 10 tablet 2022 Edward Willoughby MD        Follow-up Information     Follow up With Specialties Details Why Contact Info    Lela Wallis MD Hospitalist Schedule an appointment as soon as possible for a visit  For recheck with your primary care doctor [address] [place] [address]  [address]  [facility] [address]  [phone]      [facility] Urology Schedule an appointment as soon as possible for a visit  For recheck with specialist [address] [place] [id number]  [phone]           Hollis King, DO  22 [phone]       Hollis King, DO  [phone]

## 2022-06-04 NOTE — DISCHARGE INSTRUCTIONS
Thank you for letting us take care of you today! It was nice meeting you, and I hope you feel better soon.     Call your primary care doctor to make the first available appointment.     Keep all your medical appointments.     Take your regular medication as prescribed. Contact your primary care provider before running out of medication, or for any problems obtaining them.    Do not drive or operate heavy machinery while taking opioid or muscle relaxing medications. Examples include norco, percocet, xanax, valium, flexeril.     Overuse or long term use of pain and sedating medication may lead to addiction, dependence, organ failure, family and work problems, legal problems, accidental overdose and death.    If you do not have health insurance, you probably can afford it:  Call [phone] ([facility] hotline) or go to www.[employer].[place].gov    Your evaluation in the ED does not suggest any emergent or life threatening medical condition requiring admission or immediate intervention beyond that provided in the ED.   However, the signs of a serious problem sometimes take more time to appear.     Do not hesitate to return to the ER if any of the following occur:    Weakness, dizziness, fainting, or loss of consciousness   Fever of 100.4ºF (38ºC) or higher  Any worse symptoms  Any new or concerning symptoms    Sincerely,   Edward Willoughby MD  Board Certified Emergency Physician    To protect yourself and others from COVID19:  Get vaccinated.   Anyone over 5 years old is eligible for vaccination.   Everyone 18 and older should get 3 total vaccine doses. Anyone over 50 years old or with certain chronic conditions should get a 4th dose.   Vaccination is shown to prevent getting sick, ending up in the hospital, or dying because of COVID19.   If you are vaccinated, help friends and family get the vaccine.    If not vaccinated:  Your shot is waiting for you. To get it:   Text your ZIP code to GETVAX ([id number]) or VACUNA ([id number]) in  [place]  call 311, or [phone], or [phone], or [phone],   go to www.vaccines.gov, or  Call your health provider  If exposed to someone with cold, flu, or COVID19 symptoms, you must quarantine for at least 5 days.   Even if you have no symptoms   Otherwise you could give the virus to someone who dies from it  Some symptoms of COVID19 include fever, cough, sore throat, breathing troubles, loss of taste/smell, headaches, stomach upset, diarrhea.   Each household can get 4 free COVID19 test kits, even if you already got one set. Go to www.covidtests.gov

## 2022-06-05 LAB — BACTERIA UR CULT: NORMAL

## 2022-08-29 ENCOUNTER — OFFICE VISIT (OUTPATIENT)
Dept: FAMILY MEDICINE | Facility: CLINIC | Age: 27
End: 2022-08-29
Payer: COMMERCIAL

## 2022-08-29 VITALS
HEART RATE: 103 BPM | WEIGHT: 210.31 LBS | TEMPERATURE: 99 F | SYSTOLIC BLOOD PRESSURE: 110 MMHG | DIASTOLIC BLOOD PRESSURE: 70 MMHG | BODY MASS INDEX: 30.11 KG/M2 | OXYGEN SATURATION: 97 % | RESPIRATION RATE: 18 BRPM | HEIGHT: 70 IN

## 2022-08-29 DIAGNOSIS — N30.01 ACUTE CYSTITIS WITH HEMATURIA: Primary | ICD-10-CM

## 2022-08-29 LAB
BILIRUB SERPL-MCNC: ABNORMAL MG/DL
BLOOD URINE, POC: ABNORMAL
CLARITY, POC UA: ABNORMAL
COLOR, POC UA: ABNORMAL
GLUCOSE UR QL STRIP: NORMAL
KETONES UR QL STRIP: ABNORMAL
LEUKOCYTE ESTERASE URINE, POC: ABNORMAL
NITRITE, POC UA: POSITIVE
PH, POC UA: 8
PROTEIN, POC: ABNORMAL
SPECIFIC GRAVITY, POC UA: 1
UROBILINOGEN, POC UA: NORMAL

## 2022-08-29 PROCEDURE — 99999 PR PBB SHADOW E&M-EST. PATIENT-LVL V: CPT | Mod: PBBFAC,,,

## 2022-08-29 PROCEDURE — 1160F PR REVIEW ALL MEDS BY PRESCRIBER/CLIN PHARMACIST DOCUMENTED: ICD-10-PCS | Mod: CPTII,S$GLB,,

## 2022-08-29 PROCEDURE — 87077 CULTURE AEROBIC IDENTIFY: CPT

## 2022-08-29 PROCEDURE — 99213 OFFICE O/P EST LOW 20 MIN: CPT | Mod: S$GLB,,,

## 2022-08-29 PROCEDURE — 3074F SYST BP LT 130 MM HG: CPT | Mod: CPTII,S$GLB,,

## 2022-08-29 PROCEDURE — 87186 SC STD MICRODIL/AGAR DIL: CPT

## 2022-08-29 PROCEDURE — 3078F PR MOST RECENT DIASTOLIC BLOOD PRESSURE < 80 MM HG: ICD-10-PCS | Mod: CPTII,S$GLB,,

## 2022-08-29 PROCEDURE — 1159F PR MEDICATION LIST DOCUMENTED IN MEDICAL RECORD: ICD-10-PCS | Mod: CPTII,S$GLB,,

## 2022-08-29 PROCEDURE — 3078F DIAST BP <80 MM HG: CPT | Mod: CPTII,S$GLB,,

## 2022-08-29 PROCEDURE — 99999 PR PBB SHADOW E&M-EST. PATIENT-LVL V: ICD-10-PCS | Mod: PBBFAC,,,

## 2022-08-29 PROCEDURE — 1160F RVW MEDS BY RX/DR IN RCRD: CPT | Mod: CPTII,S$GLB,,

## 2022-08-29 PROCEDURE — 81002 URINALYSIS NONAUTO W/O SCOPE: CPT | Mod: S$GLB,,,

## 2022-08-29 PROCEDURE — 81002 POCT URINE DIPSTICK WITHOUT MICROSCOPE: ICD-10-PCS | Mod: S$GLB,,,

## 2022-08-29 PROCEDURE — 87086 URINE CULTURE/COLONY COUNT: CPT

## 2022-08-29 PROCEDURE — 3008F PR BODY MASS INDEX (BMI) DOCUMENTED: ICD-10-PCS | Mod: CPTII,S$GLB,,

## 2022-08-29 PROCEDURE — 99213 PR OFFICE/OUTPT VISIT, EST, LEVL III, 20-29 MIN: ICD-10-PCS | Mod: S$GLB,,,

## 2022-08-29 PROCEDURE — 3008F BODY MASS INDEX DOCD: CPT | Mod: CPTII,S$GLB,,

## 2022-08-29 PROCEDURE — 1159F MED LIST DOCD IN RCRD: CPT | Mod: CPTII,S$GLB,,

## 2022-08-29 PROCEDURE — 87088 URINE BACTERIA CULTURE: CPT

## 2022-08-29 PROCEDURE — 3074F PR MOST RECENT SYSTOLIC BLOOD PRESSURE < 130 MM HG: ICD-10-PCS | Mod: CPTII,S$GLB,,

## 2022-08-29 RX ORDER — SULFAMETHOXAZOLE AND TRIMETHOPRIM 800; 160 MG/1; MG/1
2 TABLET ORAL 2 TIMES DAILY
Qty: 12 TABLET | Refills: 0 | Status: SHIPPED | OUTPATIENT
Start: 2022-08-29 | End: 2022-09-01

## 2022-08-29 NOTE — PROGRESS NOTES
Subjective:       Patient ID: Kavita Cho is a 27 y.o. female.    Chief Complaint: Urinary Frequency (Discomfort)    Kavita Cho is a 28 yo F pt w/ Mhx listed below that presents to the clinic w/ complaints of     Urinary Tract Infection   This is a new problem. The current episode started in the past 7 days. The problem occurs every urination. The problem has been gradually worsening. The quality of the pain is described as burning. The pain is moderate. There has been no fever. Associated symptoms include frequency. Pertinent negatives include no chills, flank pain, hematuria, nausea, urgency, vomiting, constipation or rash. The treatment provided no relief.     Past Medical History:   Diagnosis Date    Asthma     Migraine        Review of patient's allergies indicates:   Allergen Reactions    Fremanezumab-vfrm     Galcanezumab-gnlm          Current Outpatient Medications:     AIMOVIG AUTOINJECTOR 140 mg/mL autoinjector, SMARTSI Milliliter(s) SUB-Q Every 4 Weeks, Disp: , Rfl:     EPINEPHrine (EPIPEN 2-GARCÍA) 0.3 mg/0.3 mL AtIn, Inject 0.3 mLs (0.3 mg total) into the muscle once. for 1 dose, Disp: 2 Device, Rfl: 0    EScitalopram oxalate (LEXAPRO) 10 MG tablet, TAKE 1 TABLET BY MOUTH EVERY DAY, Disp: 90 tablet, Rfl: 4    LORazepam (ATIVAN) 0.5 MG tablet, TAKE 1 TABLET (0.5 MG TOTAL) BY MOUTH EVERY 12 (TWELVE) HOURS AS NEEDED FOR ANXIETY., Disp: 30 tablet, Rfl: 1    naproxen (NAPROSYN) 500 MG tablet, Take 1 tablet (500 mg total) by mouth 2 (two) times daily as needed (pain)., Disp: 60 tablet, Rfl: 0    norethindrone (COLBY) 0.35 mg tablet, Take 1 tablet (0.35 mg total) by mouth once daily., Disp: 84 tablet, Rfl: 3    nortriptyline (PAMELOR) 50 MG capsule, nortriptyline 50 mg capsule  Take 1 capsule every day by oral route at bedtime., Disp: , Rfl:     sumatriptan (IMITREX) 100 MG tablet, sumatriptan 100 mg tablet  TAKE 1 TABLET BY MOUTH AT ONSET OF HEADACHE, MAY REPEAT 2 HOURS LATER. MAX DOSE 2 TABS/24HOURS,  Disp: , Rfl:     UBROGEPANT 100 mg tablet, Take 100 mg by mouth 2 (two) times daily as needed., Disp: , Rfl:     AJOVY SYRINGE 225 mg/1.5 mL injection, INJECT 1.5 ML INTO THE SKIN EVERY 4 WEEKS, Disp: , Rfl:     ondansetron (ZOFRAN-ODT) 4 MG TbDL, Take 1 tablet (4 mg total) by mouth every 6 (six) hours as needed (nausea or vomiting)., Disp: 12 tablet, Rfl: 0    sulfamethoxazole-trimethoprim 800-160mg (BACTRIM DS) 800-160 mg Tab, Take 2 tablets by mouth 2 (two) times daily. for 3 days, Disp: 12 tablet, Rfl: 0    tamsulosin (FLOMAX) 0.4 mg Cap, Take 1 capsule (0.4 mg total) by mouth once daily., Disp: 30 capsule, Rfl: 0    Review of Systems   Constitutional:  Negative for activity change, appetite change, chills, diaphoresis, fatigue, fever and unexpected weight change.   HENT:  Negative for congestion, ear pain, postnasal drip, rhinorrhea, sinus pressure, sneezing, sore throat and trouble swallowing.    Eyes:  Negative for pain, itching and visual disturbance.   Respiratory:  Negative for cough, chest tightness, shortness of breath and wheezing.    Cardiovascular:  Negative for chest pain, palpitations and leg swelling.   Gastrointestinal:  Negative for abdominal distention, abdominal pain, blood in stool, constipation, diarrhea, nausea and vomiting.   Endocrine: Negative for cold intolerance and heat intolerance.   Genitourinary:  Positive for dysuria and frequency. Negative for difficulty urinating, flank pain, hematuria and urgency.   Musculoskeletal:  Negative for arthralgias, back pain, myalgias and neck pain.   Skin:  Negative for color change, pallor, rash and wound.   Neurological:  Negative for dizziness, syncope, weakness, numbness and headaches.   Hematological:  Negative for adenopathy.   Psychiatric/Behavioral:  Negative for behavioral problems. The patient is not nervous/anxious.      Objective:      /70 (BP Location: Right arm, Patient Position: Sitting, BP Method: Large (Manual))   Pulse 103    "Temp 98.5 °F (36.9 °C) (Oral)   Resp 18   Ht 5' 10" (1.778 m)   Wt 95.4 kg (210 lb 5.1 oz)   LMP 08/18/2022   SpO2 97%   BMI 30.18 kg/m²   Physical Exam  Vitals reviewed.   Constitutional:       General: She is not in acute distress.     Appearance: Normal appearance. She is obese. She is not ill-appearing, toxic-appearing or diaphoretic.   HENT:      Head: Normocephalic.      Right Ear: External ear normal.      Left Ear: External ear normal.      Nose: Nose normal. No congestion or rhinorrhea.      Mouth/Throat:      Mouth: Mucous membranes are moist.      Pharynx: Oropharynx is clear.   Eyes:      General: No scleral icterus.        Right eye: No discharge.         Left eye: No discharge.      Extraocular Movements: Extraocular movements intact.      Conjunctiva/sclera: Conjunctivae normal.   Cardiovascular:      Rate and Rhythm: Normal rate and regular rhythm.      Pulses: Normal pulses.      Heart sounds: Normal heart sounds. No murmur heard.    No friction rub. No gallop.   Pulmonary:      Effort: Pulmonary effort is normal. No respiratory distress.      Breath sounds: Normal breath sounds. No wheezing, rhonchi or rales.   Chest:      Chest wall: No tenderness.   Musculoskeletal:         General: No swelling, tenderness or deformity. Normal range of motion.      Cervical back: Normal range of motion.      Right lower leg: No edema.      Left lower leg: No edema.   Skin:     General: Skin is warm and dry.      Capillary Refill: Capillary refill takes less than 2 seconds.      Coloration: Skin is not jaundiced.      Findings: No bruising, erythema, lesion or rash.   Neurological:      Mental Status: She is alert and oriented to person, place, and time.      Gait: Gait normal.   Psychiatric:         Mood and Affect: Mood normal.         Behavior: Behavior normal.         Thought Content: Thought content normal.         Judgment: Judgment normal.       Assessment:       1. Acute cystitis with hematuria     "      Plan:       Acute cystitis with hematuria  -     POCT URINE DIPSTICK WITHOUT MICROSCOPE  -     Urine culture  -     sulfamethoxazole-trimethoprim 800-160mg (BACTRIM DS) 800-160 mg Tab; Take 2 tablets by mouth 2 (two) times daily. for 3 days  Dispense: 12 tablet; Refill: 0        Positive for leuks, nitrates on POCT.    Patient plans to establish with a new PCP in the [facility] region        Derek Rossi PA-C  Family Medicine Physician Assistant       JASWINDER spent a total of 20 minutes on the day of the visit.This includes face to face time and non-face to face time preparing to see the patient (eg, review of tests), obtaining and/or reviewing separately obtained history, documenting clinical information in the electronic or other health record, independently interpreting results and communicating results to the patient/family/caregiver, or care coordinator.      We have addressed [3] Low: 2 or more self-limited or minor problems / 1 stable chronic illness / 1 acute, uncomplicated illness or injury  The complexity of the data reviewed and analyzed for this visit was [3] Limited (Reviewed prior external note, ordered unique testing or reviewed the results of each unique test)   The risk of complications and/or morbidity or mortality are [4] Moderate risk (I.e. prescription drug management / decision regarding minor surgery with identified pt or procedure risk factors / decision regarding elective major surgery without identified pt or procedure risk factors / diagnosis or treatment significantly limited by social determinants of health)   The level of Medical Decision Making for this visit is [3] Low

## 2022-08-29 NOTE — PATIENT INSTRUCTIONS
Alexandre Walls,     If you are due for any health screening(s) below please notify me so we can arrange them to be ordered and scheduled to maintain your health. Most healthy patients complete it. Don't lose out on improving your health.     All of your core healthy metrics are met

## 2022-08-31 LAB — BACTERIA UR CULT: ABNORMAL

## 2024-01-17 ENCOUNTER — OFFICE VISIT (OUTPATIENT)
Dept: OBSTETRICS AND GYNECOLOGY | Facility: CLINIC | Age: 29
End: 2024-01-17
Payer: COMMERCIAL

## 2024-01-17 VITALS
HEART RATE: 114 BPM | WEIGHT: 221.56 LBS | SYSTOLIC BLOOD PRESSURE: 142 MMHG | HEIGHT: 70 IN | DIASTOLIC BLOOD PRESSURE: 82 MMHG | BODY MASS INDEX: 31.72 KG/M2

## 2024-01-17 DIAGNOSIS — Z30.41 ENCOUNTER FOR SURVEILLANCE OF CONTRACEPTIVE PILLS: Primary | ICD-10-CM

## 2024-01-17 PROCEDURE — 1160F RVW MEDS BY RX/DR IN RCRD: CPT | Mod: CPTII,S$GLB,, | Performed by: STUDENT IN AN ORGANIZED HEALTH CARE EDUCATION/TRAINING PROGRAM

## 2024-01-17 PROCEDURE — 3079F DIAST BP 80-89 MM HG: CPT | Mod: CPTII,S$GLB,, | Performed by: STUDENT IN AN ORGANIZED HEALTH CARE EDUCATION/TRAINING PROGRAM

## 2024-01-17 PROCEDURE — 3077F SYST BP >= 140 MM HG: CPT | Mod: CPTII,S$GLB,, | Performed by: STUDENT IN AN ORGANIZED HEALTH CARE EDUCATION/TRAINING PROGRAM

## 2024-01-17 PROCEDURE — 99999 PR PBB SHADOW E&M-EST. PATIENT-LVL III: CPT | Mod: PBBFAC,,, | Performed by: STUDENT IN AN ORGANIZED HEALTH CARE EDUCATION/TRAINING PROGRAM

## 2024-01-17 PROCEDURE — 3008F BODY MASS INDEX DOCD: CPT | Mod: CPTII,S$GLB,, | Performed by: STUDENT IN AN ORGANIZED HEALTH CARE EDUCATION/TRAINING PROGRAM

## 2024-01-17 PROCEDURE — 1159F MED LIST DOCD IN RCRD: CPT | Mod: CPTII,S$GLB,, | Performed by: STUDENT IN AN ORGANIZED HEALTH CARE EDUCATION/TRAINING PROGRAM

## 2024-01-17 PROCEDURE — 99213 OFFICE O/P EST LOW 20 MIN: CPT | Mod: S$GLB,,, | Performed by: STUDENT IN AN ORGANIZED HEALTH CARE EDUCATION/TRAINING PROGRAM

## 2024-01-17 RX ORDER — NORETHINDRONE 0.35 MG/1
1 TABLET ORAL DAILY
Qty: 84 TABLET | Refills: 3 | Status: SHIPPED | OUTPATIENT
Start: 2024-01-17 | End: 2025-01-16

## 2024-01-17 NOTE — PROGRESS NOTES
[facility] Note    Subjective:     Chief Complaint: Contraception (Refill on birth control)      HPI:  2024    28-year-old female presents for refill of birth control.  She has been on control 5 years for birth control purposes.  History of migraines with aura managed by another provider.  Denies any history of hypertension, strokes or blood clots.    She is tolerating the medicine well.  About twice a year when she is under more stress, she will have 2 cycles per month but this is not an issue to her.     GYN & OB History  Patient's last menstrual period was 2024 (exact date).     Date of Last Pap: 9-15-21, negative for intraepithelial lesion or malignancy. She is due later this year.     OB History    Para Term  AB Living   0 0 0 0 0 0   SAB IAB Ectopic Multiple Live Births   0 0 0 0 0       Past Medical History:   Diagnosis Date    Asthma     Migraine      History reviewed. No pertinent surgical history.  Review of patient's allergies indicates:   Allergen Reactions    Fremanezumab-vfrm     Galcanezumab-gnlm        Social History     Socioeconomic History    Marital status: Single   Tobacco Use    Smoking status: Never    Smokeless tobacco: Never   Substance and Sexual Activity    Alcohol use: Yes     Comment: occassionally    Drug use: Never    Sexual activity: Yes     Partners: Male     Birth control/protection: OCP     Social Determinants of Health     Financial Resource Strain: Low Risk  (2022)    Overall Financial Resource Strain (CARDIA)     Difficulty of Paying Living Expenses: Not hard at all   Food Insecurity: No Food Insecurity (2022)    Hunger Vital Sign     Worried About Running Out of Food in the Last Year: Never true     Ran Out of Food in the Last Year: Never true   Transportation Needs: No Transportation Needs (2022)    PRAPARE - Transportation     Lack of Transportation (Medical): No     Lack of Transportation (Non-Medical):  No   Physical Activity: Insufficiently Active (2022)    Exercise Vital Sign     Days of Exercise per Week: 2 days     Minutes of Exercise per Session: 30 min   Stress: No Stress Concern Present (2022)    [employer] [place] [employer] - Occupational Stress Questionnaire     Feeling of Stress : Only a little   Social Connections: Unknown (2022)    Social Connection and Isolation Panel [NHANES]     Frequency of Communication with Friends and Family: Three times a week     Frequency of Social Gatherings with Friends and Family: Twice a week     Active Member of Clubs or Organizations: No     Marital Status: Never    Housing Stability: Unknown (2022)    Housing Stability Vital Sign     Unable to Pay for Housing in the Last Year: No     Unstable Housing in the Last Year: No       Family History   Problem Relation Age of Onset    Migraines Maternal Grandmother     Hypertension Father     Insomnia Mother     Irregular heart beat Mother     Migraines Mother     Diabetes Maternal Aunt     Migraines Maternal Aunt     Breast cancer Maternal Aunt     Migraines Maternal Uncle     Colon cancer Neg Hx     Ovarian cancer Neg Hx        Medications  Current Outpatient Medications on File Prior to Visit   Medication Sig Dispense Refill Last Dose    AIMOVIG AUTOINJECTOR 140 mg/mL autoinjector SMARTSI Milliliter(s) SUB-Q Every 4 Weeks   Taking    LORazepam (ATIVAN) 0.5 MG tablet TAKE 1 TABLET (0.5 MG TOTAL) BY MOUTH EVERY 12 (TWELVE) HOURS AS NEEDED FOR ANXIETY. 30 tablet 1 Taking    naproxen (NAPROSYN) 500 MG tablet Take 1 tablet (500 mg total) by mouth 2 (two) times daily as needed (pain). 60 tablet 0 Taking    nortriptyline (PAMELOR) 50 MG capsule nortriptyline 50 mg capsule   Take 1 capsule every day by oral route at bedtime.   Taking    sumatriptan (IMITREX) 100 MG tablet sumatriptan 100 mg tablet   TAKE 1 TABLET BY MOUTH AT ONSET OF HEADACHE, MAY REPEAT 2 HOURS LATER. MAX DOSE 2 TABS/24HOURS  "  Taking    UBROGEPANT 100 mg tablet Take 100 mg by mouth 2 (two) times daily as needed.   Taking    [DISCONTINUED] COLBY 0.35 mg tablet TAKE 1 TABLET BY MOUTH EVERY DAY 84 tablet 2 Taking    EPINEPHrine (EPIPEN 2-GARCÍA) 0.3 mg/0.3 mL AtIn Inject 0.3 mLs (0.3 mg total) into the muscle once. for 1 dose 2 Device 0     [DISCONTINUED] AJOVY SYRINGE 225 mg/1.5 mL injection INJECT 1.5 ML INTO THE SKIN EVERY 4 WEEKS       [DISCONTINUED] EScitalopram oxalate (LEXAPRO) 10 MG tablet TAKE 1 TABLET BY MOUTH EVERY DAY 30 tablet 23        Review of Systems   Constitutional: Negative for appetite change, fever and unexpected weight change.   Respiratory: Negative for cough and shortness of breath.    Cardiovascular: Negative for chest pain and palpitations.   Genitourinary: Negative for dyspareunia, dysuria, hematuria and pelvic pain.        GYN ROS per HPI.   Psychiatric/Behavioral: The patient is not nervous/anxious.      Objective:     BP (!) 142/82 (BP Location: Left arm, Patient Position: Sitting, BP Method: Medium (Automatic))   Pulse (!) 114   Ht 5' 10" (1.778 m)   Wt 100.5 kg (221 lb 9 oz)   LMP 01/16/2024 (Exact Date)   BMI 31.79 kg/m²     Physical Exam  Constitutional:       General: She is not in acute distress.  HENT:      Head: Normocephalic.   Pulmonary:      Effort: Pulmonary effort is normal. No respiratory distress.   Genitourinary:     Comments: Patient declined.  Neurological:      General: No focal deficit present.      Mental Status: She is alert.   Psychiatric:         Mood and Affect: Mood normal.         Behavior: Behavior normal.         Assessment:     1. Encounter for surveillance of contraceptive pills      Plan:     1. Encounter for surveillance of contraceptive pills  - norethindrone (COLBY) 0.35 mg tablet; Take 1 tablet (0.35 mg total) by mouth once daily.  Dispense: 84 tablet; Refill: 3      Follow up for annual exam or sooner if any GYN issues arise.  Follow-up sooner if needed.     Refilled " birth control.     Discussed that she is due for a pap smear. She declined at today's visit since she started her cycle 1 day ago. She will schedule her annual exam on the portal.   Start the new pack on upcoming Sunday.     Nataliia Ashraf PA-C  01/17/2024

## 2024-03-05 ENCOUNTER — OFFICE VISIT (OUTPATIENT)
Dept: OBSTETRICS AND GYNECOLOGY | Facility: CLINIC | Age: 29
End: 2024-03-05
Payer: COMMERCIAL

## 2024-03-05 ENCOUNTER — LAB VISIT (OUTPATIENT)
Dept: LAB | Facility: HOSPITAL | Age: 29
End: 2024-03-05
Attending: STUDENT IN AN ORGANIZED HEALTH CARE EDUCATION/TRAINING PROGRAM
Payer: COMMERCIAL

## 2024-03-05 VITALS
HEIGHT: 70 IN | DIASTOLIC BLOOD PRESSURE: 77 MMHG | BODY MASS INDEX: 31.75 KG/M2 | WEIGHT: 221.81 LBS | SYSTOLIC BLOOD PRESSURE: 126 MMHG | HEART RATE: 114 BPM

## 2024-03-05 DIAGNOSIS — Z11.3 SCREENING EXAMINATION FOR SEXUALLY TRANSMITTED DISEASE: ICD-10-CM

## 2024-03-05 DIAGNOSIS — Z01.419 WELL WOMAN EXAM WITH ROUTINE GYNECOLOGICAL EXAM: Primary | ICD-10-CM

## 2024-03-05 DIAGNOSIS — Z12.4 SCREENING FOR MALIGNANT NEOPLASM OF CERVIX: ICD-10-CM

## 2024-03-05 DIAGNOSIS — Z30.017 ENCOUNTER FOR INITIAL PRESCRIPTION OF IMPLANTABLE SUBDERMAL CONTRACEPTIVE: ICD-10-CM

## 2024-03-05 LAB
HBV SURFACE AG SERPL QL IA: NORMAL
HCV AB SERPL QL IA: NORMAL
HIV 1+2 AB+HIV1 P24 AG SERPL QL IA: NORMAL

## 2024-03-05 PROCEDURE — 86803 HEPATITIS C AB TEST: CPT | Performed by: STUDENT IN AN ORGANIZED HEALTH CARE EDUCATION/TRAINING PROGRAM

## 2024-03-05 PROCEDURE — 99395 PREV VISIT EST AGE 18-39: CPT | Mod: S$GLB,,, | Performed by: STUDENT IN AN ORGANIZED HEALTH CARE EDUCATION/TRAINING PROGRAM

## 2024-03-05 PROCEDURE — 3074F SYST BP LT 130 MM HG: CPT | Mod: CPTII,S$GLB,, | Performed by: STUDENT IN AN ORGANIZED HEALTH CARE EDUCATION/TRAINING PROGRAM

## 2024-03-05 PROCEDURE — 3008F BODY MASS INDEX DOCD: CPT | Mod: CPTII,S$GLB,, | Performed by: STUDENT IN AN ORGANIZED HEALTH CARE EDUCATION/TRAINING PROGRAM

## 2024-03-05 PROCEDURE — 3078F DIAST BP <80 MM HG: CPT | Mod: CPTII,S$GLB,, | Performed by: STUDENT IN AN ORGANIZED HEALTH CARE EDUCATION/TRAINING PROGRAM

## 2024-03-05 PROCEDURE — 81514 NFCT DS BV&VAGINITIS DNA ALG: CPT | Performed by: STUDENT IN AN ORGANIZED HEALTH CARE EDUCATION/TRAINING PROGRAM

## 2024-03-05 PROCEDURE — 87491 CHLMYD TRACH DNA AMP PROBE: CPT | Performed by: STUDENT IN AN ORGANIZED HEALTH CARE EDUCATION/TRAINING PROGRAM

## 2024-03-05 PROCEDURE — 36415 COLL VENOUS BLD VENIPUNCTURE: CPT | Mod: PO | Performed by: STUDENT IN AN ORGANIZED HEALTH CARE EDUCATION/TRAINING PROGRAM

## 2024-03-05 PROCEDURE — 1159F MED LIST DOCD IN RCRD: CPT | Mod: CPTII,S$GLB,, | Performed by: STUDENT IN AN ORGANIZED HEALTH CARE EDUCATION/TRAINING PROGRAM

## 2024-03-05 PROCEDURE — 88175 CYTOPATH C/V AUTO FLUID REDO: CPT | Performed by: STUDENT IN AN ORGANIZED HEALTH CARE EDUCATION/TRAINING PROGRAM

## 2024-03-05 PROCEDURE — 87340 HEPATITIS B SURFACE AG IA: CPT | Performed by: STUDENT IN AN ORGANIZED HEALTH CARE EDUCATION/TRAINING PROGRAM

## 2024-03-05 PROCEDURE — 99999 PR PBB SHADOW E&M-EST. PATIENT-LVL III: CPT | Mod: PBBFAC,,, | Performed by: STUDENT IN AN ORGANIZED HEALTH CARE EDUCATION/TRAINING PROGRAM

## 2024-03-05 PROCEDURE — 99459 PELVIC EXAMINATION: CPT | Mod: S$GLB,,, | Performed by: STUDENT IN AN ORGANIZED HEALTH CARE EDUCATION/TRAINING PROGRAM

## 2024-03-05 PROCEDURE — 86592 SYPHILIS TEST NON-TREP QUAL: CPT | Performed by: STUDENT IN AN ORGANIZED HEALTH CARE EDUCATION/TRAINING PROGRAM

## 2024-03-05 PROCEDURE — 87389 HIV-1 AG W/HIV-1&-2 AB AG IA: CPT | Performed by: STUDENT IN AN ORGANIZED HEALTH CARE EDUCATION/TRAINING PROGRAM

## 2024-03-05 PROCEDURE — 1160F RVW MEDS BY RX/DR IN RCRD: CPT | Mod: CPTII,S$GLB,, | Performed by: STUDENT IN AN ORGANIZED HEALTH CARE EDUCATION/TRAINING PROGRAM

## 2024-03-05 NOTE — PATIENT INSTRUCTIONS
Have a question or concern?  for an emergency  call 911 or go to the nearest hospital [facility] Nurse Care Advice Line  [phone]  you can speak to a nurse 24-7   for non-urgent issues, send us a  message in [employer] for non-urgent issues, call the clinic  [phone], Option 3   Consider calling the Nurse Care Advice Line if it's a weekend or toward the end of the work-day since [place] and phone messages may not be answered right away.     PAP SMEARS:  Screening for cervical cancer involves 1 or 2 parts based on your age and situation:  PAP smear (looking at the cells from your cervix)  HPV testing (checking whether you have the Human Papilloma Virus in your cervical cells)    These test results often come back at different times, but you won't hear from me until they BOTH are back since both pieces of information are important in deciding what to do next.    Soif you see a PAP result in [employer] (or an HPV result) that is abnormal, please allow another 7-10 business days before you send a message asking what to do next.  I am waiting for the other test result before I make a recommendation.    If both tests are resulted in [employer], you should hear from me within 2-3 business days.    Abnormal tests will be followed up in one of two ways:  Repeat testing of PAP and/or HPV  Colposcopy (examination and biopsy of your cervix in the office using staining solutions and magnification)    If you have had abnormal pap smears recently, you will need 3 years of consecutive normal pap smears and negative HPV testing before resuming pap smears every 3-5 years.      STD AND VAGINITIS TESTING:  If you are enrolled in [employer], I will trust that when you see a negative result, you understand that means you do not have the particular STD we were checking for.      If something comes back positive, one of my staff members or I will call you to let you know about the result and what the recommended treatment is.  For most  STD's, it is VERY important that you do not have sex until both you and your partner(s) have completed treatment for the STD.  I cannot prescribe medications for your partner(s).    Bacterial Vaginitis (BV) and vaginal yeast infections (Candida, for example) are not STD's.    Trichomonas is an STD.    If you are prescribed an antibiotic, it is very important that you take all of the medicine as prescribed.  Incomplete treatment can cause a relapse and/or antibiotic resistance.    BIOPSIES (endometrial, cervical, vaginal, vulvar, etc.):  Biopsy results can take anywhere from 2 to 10 days to return from the pathologist.    If the treatment plan is simple or unchanged based on the biopsy result, I'll probably send a message in [employer].    If the treatment plan is complex and/or the biopsy result is cancer, I'll call you to discuss.  In some cases, I'll have my staff schedule an appointment for you to come in to discuss things in person.    If it's been two weeks since your biopsy, and you haven't heard from us, PLEASE REACH OUT to check on things.    BLADDER INFECTIONS & URINARY TRACT INFECTIONS (UTI):  There are two ways to check for a UTI:  Urinalysis or UA (checking chemical markers and/or looking under a microscope to give us an idea of whether or not there is an infection)  Results are available same-day  Results are NOT definitive  Urine Culture (putting your urine on a petri dish to see if bacteria grow)  Results take a few days to come back  This is the definitive test    If you are prescribed an antibiotic, it is very important that you take all of the medicine as prescribed.  Incomplete treatment can cause a relapse and/or antibiotic resistance.    If you are pregnant and have a UTI, you are at increased risk of developing pyelonephritis (infection of the kidneys).  It is extremely important that you complete the antibiotic treatment.  After you complete treatment, we will check your urine again to make sure  "the infection is cleared.    GENERAL BLOOD TESTS:  Many parts of a blood test can be flagged as "abnormal" by the system, but based on your age and other factors, it might be considered normal.    If your test is normal, and no follow-up is needed, you will not get a message from me.    If your test is abnormal, I typically will send you a message in [employer] with recommendations (starting a medication, repeating the test, checking other tests, etc.).  Sometimes one of my staff members or I will call you.    ULTRASOUND OR CT SCANS:  In most cases, if the treatment plan is simple or unchanged based on the study result, I'll probably send a message in [employer].    If the treatment plan is complex and/or the study result is concerning, I'll call you to discuss.  In some cases, I'll have my staff schedule an appointment for you to come in to discuss things in person.    GENERAL HEALTH MAINTENANCE:  Mammograms can be performed every 12 months, and in some cases they may be done sooner depending on prior mammograms.     Contact PCP for other health maintenance issues if indicated (Colon cancer screening, DEXA scan, etc.).    Patient has been counseled on the vitamin D and calcium requirements per ACOG recommendations.      Age         Calcium(mg/day)       Vitamin D (IU/day)  19-50              1000                       600  51-70              1200                       600  >70                  1200                       800   "

## 2024-03-05 NOTE — PROGRESS NOTES
"[facility]    Subjective:     Chief Complaint:   Chief Complaint   Patient presents with    Annual Exam       Patient's last menstrual period was Patient's last menstrual period was 2024 (approximate).  Contraception: OCP.    2024    Kavita Cho is a 28 y.o. female  who presents for an annual exam.  She does want STD screening.  She participates in regular exercise: no.  She does not smoke or vape.  She is not taking a multivitamin, vitamin D, and calcium.     Menstrual cycles occur monthly lasting 5 days with mild cramps. She has been on OCP for years and wants something for less maintenance. She does not have any issues with her moods. She is considering the "implant".   History of STI: never.  Sexually active: yes 1 partner.     Last Pap: . Results: negative for lesions or malignancy. Denies any history of abnormal pap smears.  Gardasil series: she will check her records.     FH:  Breast cancer: maternal aunt ().  Colon cancer: none.  Endometrial cancer: none.  Ovarian cancer: none.  Cervical cancer: none.       OB History    Para Term  AB Living   0 0 0 0 0 0   SAB IAB Ectopic Multiple Live Births   0 0 0 0 0       Past Medical History:   Diagnosis Date    Asthma     Migraine      History reviewed. No pertinent surgical history.  Review of patient's allergies indicates:   Allergen Reactions    Fremanezumab-vfrm     Galcanezumab-gnlm        Social History     Tobacco Use    Smoking status: Never    Smokeless tobacco: Never   Substance Use Topics    Alcohol use: Yes     Comment: occassionally    Drug use: Never       Family History   Problem Relation Age of Onset    Insomnia Mother     Irregular heart beat Mother     Migraines Mother     Hypertension Father     Migraines Maternal Grandmother     Diabetes Maternal Aunt     Migraines Maternal Aunt     Breast cancer Maternal Aunt     Migraines Maternal Uncle     Colon cancer Neg Hx     Ovarian " "cancer Neg Hx        Medications  Current Outpatient Medications on File Prior to Visit   Medication Sig Dispense Refill Last Dose    AIMOVIG AUTOINJECTOR 140 mg/mL autoinjector SMARTSI Milliliter(s) SUB-Q Every 4 Weeks   Taking    EPINEPHrine (EPIPEN 2-GARCÍA) 0.3 mg/0.3 mL AtIn Inject 0.3 mLs (0.3 mg total) into the muscle once. for 1 dose 2 Device 0 Taking    naproxen (NAPROSYN) 500 MG tablet Take 1 tablet (500 mg total) by mouth 2 (two) times daily as needed (pain). 60 tablet 0 Taking    norethindrone (COLBY) 0.35 mg tablet Take 1 tablet (0.35 mg total) by mouth once daily. 84 tablet 3 Taking    nortriptyline (PAMELOR) 50 MG capsule nortriptyline 50 mg capsule   Take 1 capsule every day by oral route at bedtime.   Taking    sumatriptan (IMITREX) 100 MG tablet sumatriptan 100 mg tablet   TAKE 1 TABLET BY MOUTH AT ONSET OF HEADACHE, MAY REPEAT 2 HOURS LATER. MAX DOSE 2 TABS/24HOURS   Taking    UBROGEPANT 100 mg tablet Take 100 mg by mouth 2 (two) times daily as needed.   Taking    [DISCONTINUED] LORazepam (ATIVAN) 0.5 MG tablet TAKE 1 TABLET (0.5 MG TOTAL) BY MOUTH EVERY 12 (TWELVE) HOURS AS NEEDED FOR ANXIETY. (Patient not taking: Reported on 3/5/2024) 30 tablet 1 Not Taking       Review of Systems   Constitutional: Negative for appetite change, fever and unexpected weight change.   Respiratory: Negative for cough and shortness of breath.    Cardiovascular: Negative for chest pain and palpitations.   Genitourinary: Negative for dyspareunia, dysuria, hematuria and pelvic pain.        GYN ROS per HPI.   Psychiatric/Behavioral: The patient is not nervous/anxious.      Objective:     /77 (BP Location: Left arm, Patient Position: Sitting, BP Method: Medium (Automatic))   Pulse (!) 114   Ht 5' 10" (1.778 m)   Wt 100.6 kg (221 lb 12.5 oz)   LMP 2024 (Approximate)   BMI 31.82 kg/m²     Physical Exam  Vitals reviewed. Chaperone present: Female chaperone present.   Constitutional:       General: She is not " in acute distress.  HENT:      Head: Normocephalic.   Eyes:      General: No scleral icterus.  Pulmonary:      Effort: Pulmonary effort is normal. No respiratory distress.   Chest:   Breasts:     Right: No bleeding, mass, nipple discharge, skin change or tenderness.      Left: No bleeding, mass, nipple discharge, skin change or tenderness.   Abdominal:      General: Abdomen is flat.      Palpations: Abdomen is soft.   Genitourinary:     Pubic Area: No rash.       Labia:         Right: No tenderness or lesion.         Left: No tenderness or lesion.       Vagina: No tenderness or prolapsed vaginal walls.      Cervix: No cervical motion tenderness.      Uterus: Not enlarged and not tender.       Adnexa:         Right: No mass or tenderness.          Left: No mass or tenderness.     Lymphadenopathy:      Upper Body:      Right upper body: No axillary or pectoral adenopathy.      Left upper body: No axillary or pectoral adenopathy.   Skin:     Findings: No rash.   Neurological:      General: No focal deficit present.      Mental Status: She is alert.   Psychiatric:         Mood and Affect: Mood normal.         Behavior: Behavior normal.         Assessment:     1. Well woman exam with routine gynecological exam    2. Screening for malignant neoplasm of cervix    3. Screening examination for sexually transmitted disease    4. Encounter for initial prescription of implantable subdermal contraceptive      Plan:     1. Well woman exam with routine gynecological exam    2. Screening for malignant neoplasm of cervix  - Liquid-Based Pap Smear, Screening    3. Screening examination for sexually transmitted disease  - Hepatitis B Surface Antigen; Future  - Hepatitis C Antibody; Future  - RPR; Future  - HIV 1/2 Ag/Ab (4th Gen); Future  - C. trachomatis/N. gonorrhoeae by AMP DNA [facility]; Cervicovaginal  - Vaginosis Screen by DNA Probe    4. Encounter for initial prescription of implantable subdermal contraceptive  - Device  Authorization Order      Follow up for Nexplanon placement.       The above was reviewed and discussed with the patient.    Annual exam and screening issues based on the patient's age and family history were discussed.     Breat and pelvic exam were normal.       We discussed hormonal birth control such as oral contraceptives, patches, vaginal rings, Depo-Provera and the subdermal implant.  In addition, IUDs were discussed.  The pros, cons, risks, benefits, alternatives and indications of the medication(s) prescribed, as well as appropriate use discussed.    She elects to try the  Nexplanon.    She will contact the clinic to schedule her Nexplanon placement.     - Pap testing performed. Further recommendations for future pap smears and HPV testing will be provided once results return on the portal.   - Counseled to get regular exercise at least 3 days a week doing 30 minutes of high intensity exercise or 60 minutes of low-moderate intensity exercise if patient is not currently exercising.   - Counseled to take daily multivitamin, vitamin D or calcium for bone integrity if patient is not taking one already. Explained the effects of estrogen on bone health.  Patient has been counseled on the vitamin D and calcium requirements per ACOG recommendations.      The patient's questions were answered, and she agrees with the current plan.      The patient was counseled today on the new ACS guidelines for cervical cytology screening as well as the current recommendations for breast cancer screening. She was counseled to follow up with her PCP for other routine health maintenance.      Nataliia Ashraf PA-C  03/05/2024

## 2024-03-06 LAB
BACTERIAL VAGINOSIS DNA: NEGATIVE
C TRACH DNA SPEC QL NAA+PROBE: NOT DETECTED
CANDIDA GLABRATA DNA: NEGATIVE
CANDIDA KRUSEI DNA: NEGATIVE
CANDIDA RRNA VAG QL PROBE: NEGATIVE
N GONORRHOEA DNA SPEC QL NAA+PROBE: NOT DETECTED
RPR SER QL: NORMAL
T VAGINALIS RRNA GENITAL QL PROBE: NEGATIVE

## 2024-03-11 LAB
FINAL PATHOLOGIC DIAGNOSIS: NORMAL
Lab: NORMAL

## 2024-08-22 ENCOUNTER — OFFICE VISIT (OUTPATIENT)
Dept: INTERNAL MEDICINE | Facility: CLINIC | Age: 29
End: 2024-08-22
Payer: COMMERCIAL

## 2024-08-22 DIAGNOSIS — M26.609 TMJ (TEMPOROMANDIBULAR JOINT DISORDER): Primary | ICD-10-CM

## 2024-08-22 PROCEDURE — 99213 OFFICE O/P EST LOW 20 MIN: CPT | Mod: 95,,, | Performed by: NURSE PRACTITIONER

## 2024-08-22 PROCEDURE — 1160F RVW MEDS BY RX/DR IN RCRD: CPT | Mod: CPTII,95,, | Performed by: NURSE PRACTITIONER

## 2024-08-22 PROCEDURE — 1159F MED LIST DOCD IN RCRD: CPT | Mod: CPTII,95,, | Performed by: NURSE PRACTITIONER

## 2024-08-22 RX ORDER — MELOXICAM 15 MG/1
15 TABLET ORAL DAILY
Qty: 5 TABLET | Refills: 0 | Status: SHIPPED | OUTPATIENT
Start: 2024-08-22 | End: 2024-08-27

## 2024-08-22 NOTE — PROGRESS NOTES
"Subjective:       Patient ID: Kavita Cho is a 29 y.o. female.    Chief Complaint: Jaw Pain    Visit type: audiovisual    Kavita Cho is a 29 y.o. female who presents today for:    Jaw Pain  Patient notes that this weekend she yawned very big and felt jaw pain and pop. She was able to close her mouth and "pop" her jaw back into place. She notes since that time her left side of the jaw/mandible has been painful and she is unable to open her mouth more than 50% of the way because if she does there is pain and popping out of place.   Patient denies prior TMJ issues or joint dislocations.     The patient location is: [place]    Answers submitted by the patient for this visit:  Review of Systems Questionnaire (Submitted on 2024)  activity change: No  unexpected weight change: No  rhinorrhea: No  trouble swallowing: No  visual disturbance: No  chest tightness: No  polyuria: No  difficulty urinating: No  menstrual problem: No  dysphoric mood: No    Review of patient's allergies indicates:   Allergen Reactions    Fremanezumab-rm     Galcanezumab-[facility]        Medication List with Changes/Refills   New Medications    MELOXICAM (MOBIC) 15 MG TABLET    Take 1 tablet (15 mg total) by mouth once daily. for 5 days   Current Medications    AIMOVIG AUTOINJECTOR 140 MG/ML AUTOINJECTOR    SMARTSI Milliliter(s) SUB-Q Every 4 Weeks    EPINEPHRINE (EPIPEN 2-GARCÍA) 0.3 MG/0.3 ML ATIN    Inject 0.3 mLs (0.3 mg total) into the muscle once. for 1 dose    NAPROXEN (NAPROSYN) 500 MG TABLET    Take 1 tablet (500 mg total) by mouth 2 (two) times daily as needed (pain).    NORETHINDRONE (COLBY) 0.35 MG TABLET    Take 1 tablet (0.35 mg total) by mouth once daily.    NORTRIPTYLINE (PAMELOR) 50 MG CAPSULE    nortriptyline 50 mg capsule   Take 1 capsule every day by oral route at bedtime.    SUMATRIPTAN (IMITREX) 100 MG TABLET    sumatriptan 100 mg tablet   TAKE 1 TABLET BY MOUTH AT ONSET OF HEADACHE, MAY REPEAT 2 HOURS LATER. MAX DOSE 2 " "TABS/24HOURS    UBROGEPANT 100 MG TABLET    Take 100 mg by mouth 2 (two) times daily as needed.       Review of Systems   HENT:  Negative for hearing loss.         Jaw Pain   Eyes:  Negative for discharge.   Respiratory:  Negative for wheezing.    Cardiovascular:  Negative for chest pain and palpitations.   Gastrointestinal:  Negative for blood in stool, constipation, diarrhea and vomiting.   Genitourinary:  Negative for dysuria and hematuria.   Musculoskeletal:  Negative for neck pain.   Neurological:  Positive for headaches.   Endo/Heme/Allergies:  Negative for polydipsia.       Objective:   LMP 08/12/2024 (Approximate)     Physical Exam  Vitals reviewed: Exam Limited due to Video Consultation.   Constitutional:       General: She is not in acute distress.  HENT:      Head:      Jaw: Tenderness and pain on movement present.        Comments: With palpation of TMJ and mouth opening patient reports pain and "pop".      Left Ear: External ear normal.      Mouth/Throat:      Comments: Patient asked to open mouth in view of camera with limited ability.  Neurological:      Mental Status: She is alert and oriented to person, place, and time.       Assessment and Plan:       1. TMJ (temporomandibular joint disorder)  - meloxicam (MOBIC) 15 MG tablet; Take 1 tablet (15 mg total) by mouth once daily. for 5 days  Dispense: 5 tablet; Refill: 0    Patient advised to begin liquid/soft diet for the next 3-5 days to allow jaw rest. She has been encouraged to limit talking, excessive mouth opening for the same amount of time. To take NSAID daily x 5 days. Encouraged to make appointment with dentist but should she be unable to get appointment or should her symptoms not be resolve by Monday she has been advised to follow up for imaging and referral.     Face to Face time with patient: 7  15 minutes of total time spent on the encounter, which includes face to face time and non-face to face time preparing to see the patient (eg, review " of tests), Obtaining and/or reviewing separately obtained history, Documenting clinical information in the electronic or other health record, Independently interpreting results (not separately reported) and communicating results to the patient/family/caregiver, or Care coordination (not separately reported).     Each patient to whom he or she provides medical services by telemedicine is:  (1) informed of the relationship between the physician and patient and the respective role of any other health care provider with respect to management of the patient; and (2) notified that he or she may decline to receive medical services by telemedicine and may withdraw from such care at any time.

## 2024-08-24 DIAGNOSIS — M26.609 TMJ (TEMPOROMANDIBULAR JOINT DISORDER): ICD-10-CM

## 2024-08-26 RX ORDER — MELOXICAM 15 MG/1
TABLET ORAL
Qty: 5 TABLET | Refills: 0 | OUTPATIENT
Start: 2024-08-26

## 2024-08-26 NOTE — TELEPHONE ENCOUNTER
Refill Routing Note   Medication(s) are not appropriate for processing by [facility] for the following reason(s):        Outside of protocol  Non-participating provider    [facility] action(s):  Route               Appointments  past 12m or future 3m with PCP    Date Provider   Last Visit   8/22/2024 Guerda Cody NP   Next Visit   8/26/2024 Guerda Cody NP   ED visits in past 90 days: 0        Note composed:9:03 AM 08/26/2024

## 2024-08-26 NOTE — TELEPHONE ENCOUNTER
The patient was called, no answer. Mail box full and cannot leave a message.    TIGIST Croft is needing to know how the patient is doing before refill can be given.

## 2025-01-08 ENCOUNTER — OFFICE VISIT (OUTPATIENT)
Dept: OBSTETRICS AND GYNECOLOGY | Facility: CLINIC | Age: 30
End: 2025-01-08
Payer: COMMERCIAL

## 2025-01-08 VITALS
WEIGHT: 240.31 LBS | HEIGHT: 69 IN | BODY MASS INDEX: 35.59 KG/M2 | SYSTOLIC BLOOD PRESSURE: 122 MMHG | DIASTOLIC BLOOD PRESSURE: 84 MMHG

## 2025-01-08 DIAGNOSIS — Z30.41 ENCOUNTER FOR SURVEILLANCE OF CONTRACEPTIVE PILLS: ICD-10-CM

## 2025-01-08 DIAGNOSIS — Z30.017 ENCOUNTER FOR INITIAL PRESCRIPTION OF IMPLANTABLE SUBDERMAL CONTRACEPTIVE: Primary | ICD-10-CM

## 2025-01-08 PROCEDURE — 99999 PR PBB SHADOW E&M-EST. PATIENT-LVL III: CPT | Mod: PBBFAC,,, | Performed by: OBSTETRICS & GYNECOLOGY

## 2025-01-08 RX ORDER — NORETHINDRONE 0.35 MG/1
1 TABLET ORAL DAILY
Qty: 84 TABLET | Refills: 3 | Status: SHIPPED | OUTPATIENT
Start: 2025-01-08 | End: 2026-01-08

## 2025-01-08 NOTE — PROGRESS NOTES
Chief Complaint   Patient presents with    Contraception       HPI:  Kavita Cho is a 29 y.o. female patient  who presents today to discuss contraceptive options and the Nexplanon in detail.  She is currently on Saba progesterone only pills with monthly menses lasting 6 days in duration, without intermenstrual bleeding.  She is interested in discussing long-acting reversible contraception.  She has a history of migraine headaches, generally without aura.  Headaches seem to occur about once a week and are not related to her menses.  Patient's last menstrual period was 2024 (exact date).    3/5/24 Pap: Negative    Past Medical History:   Diagnosis Date    Asthma     Migraine        Past Surgical History:   Procedure Laterality Date    WISDOM TOOTH EXTRACTION           Diagnosis:  1. Encounter for initial prescription of implantable subdermal contraceptive    2. Encounter for surveillance of contraceptive pills          PLAN:    Orders Placed This Encounter    Device Authorization Order    norethindrone (SABA) 0.35 mg tablet       Patient was counseled today on contraceptive options with her history of migraine headaches, occasionally with aura.  We discussed the need to avoid combination estrogen / progesterone contraception.  Instead, we reviewed the recommendation for progesterone only contraception (POP, Nexplanon, progesterone IUDs).  She has considered her options and would like to proceed with the Nexplanon.  We reviewed the Nexplanon: risks, benefits, correct usage.  She is aware of possible irregular bleeding and weight gain associated with the Nexplanon.  For now, she will continue with Saba progesterone only contraceptive pill and contact us with the onset of her next period to schedule Nexplanon insertion.    Follow-up for Nexplanon insertion.    I spent a total of 35 minutes on the day of the visit.This includes face to face time and non-face to face time preparing to see the patient (eg,  review of tests), Obtaining and/or reviewing separately obtained history, Documenting clinical information in the electronic or other health record, Independently interpreting resultsand communicating results to the patient/family/caregiver, or Care coordination.    This note was created with voice recognition software.  Grammatical, syntax and spelling errors may be inevitable.      Answers submitted by the patient for this visit:  Gynecologic Exam Questionnaire  (Submitted on 2025)  Chief Complaint: Gynecologic exam  genital itching: No  genital lesions: No  genital odor: No  genital rash: No  missed menses: No  pelvic pain: No  vaginal bleeding: No  vaginal discharge: No  Pregnant now?: No  Sexual activity: sexually active  Partner with STD symptoms: no  Birth control: oral contraceptives  STD: No  abdominal surgery: No   section: No  Ectopic pregnancy: No  Endometriosis: No  herpes simplex: No  gynecological surgery: No  menorrhagia: No  metrorrhagia: No  miscarriage: No  ovarian cysts: No  perineal abscess: No  PID: No  terminated pregnancy: No  vaginosis: No

## 2025-02-11 ENCOUNTER — TELEPHONE (OUTPATIENT)
Dept: OBSTETRICS AND GYNECOLOGY | Facility: CLINIC | Age: 30
End: 2025-02-11
Payer: COMMERCIAL

## 2025-02-21 ENCOUNTER — TELEPHONE (OUTPATIENT)
Dept: OBSTETRICS AND GYNECOLOGY | Facility: CLINIC | Age: 30
End: 2025-02-21
Payer: COMMERCIAL

## 2025-02-21 NOTE — TELEPHONE ENCOUNTER
----- Message from Anel sent at 2/21/2025  2:39 PM CST -----  Contact: [phone]  [email or web address] Patient is calling for Medical Advice regarding:pt is calling she saw Dr. Diez on 1/8/25 and was told to call back 1st day of her new cycle.  She is trying to schedule an appt but it was deny see message in comments.  Please rita pt back and advise and to schedule her appt.How long has patient had these symptoms:Pharmacy name and phone#:Patient wants a call back or thru [facility]:callbackComments:We will need to connect you with a member of our clinical team to acquire a prior authorization before scheduling this appointment.Please advise patient replies from provider may take up to 48 hours.

## 2025-02-21 NOTE — TELEPHONE ENCOUNTER
----- Message from Nani sent at 2/21/2025  4:36 PM CST -----  Regarding: returning call  Type:  Patient Returning CallWho Called:pt Who Left Message for Patient:Judi Does the patient know what this is regarding?medical advice Best Call Back Number: [phone] Information:

## 2025-02-25 ENCOUNTER — PROCEDURE VISIT (OUTPATIENT)
Dept: OBSTETRICS AND GYNECOLOGY | Facility: CLINIC | Age: 30
End: 2025-02-25
Attending: OBSTETRICS & GYNECOLOGY
Payer: COMMERCIAL

## 2025-02-25 VITALS
DIASTOLIC BLOOD PRESSURE: 84 MMHG | WEIGHT: 235.25 LBS | SYSTOLIC BLOOD PRESSURE: 118 MMHG | HEIGHT: 70 IN | BODY MASS INDEX: 33.68 KG/M2

## 2025-02-25 DIAGNOSIS — Z30.017 NEXPLANON INSERTION: Primary | ICD-10-CM

## 2025-02-25 PROCEDURE — 11981 INSERTION DRUG DLVR IMPLANT: CPT | Mod: S$GLB,,, | Performed by: OBSTETRICS & GYNECOLOGY

## 2025-02-25 PROCEDURE — 81025 URINE PREGNANCY TEST: CPT | Mod: S$GLB,,, | Performed by: OBSTETRICS & GYNECOLOGY

## 2025-02-25 NOTE — PROCEDURES
"  CC: NEXPLANON INSERTION    Kavita Cho is a 29 y.o. female patient  who presents for Nexplanon insertion.  She is currently on Saba progesterone only pills with monthly menses lasting 6 days in duration, without intermenstrual bleeding.  We have reviewed Nexplanon: r / b / expected bleeding patterns, etc.  She is aware of possible irregular bleeding and weight changes.    Blood pressure 118/84, height 5' 10" (1.778 m), weight 106.7 kg (235 lb 3.7 oz), last menstrual period 2025.    UPT today: Negative    PRE-NEXPLANON INSERTION COUNSELING:  All contraceptive options were reviewed and the patient chooses Nexplanon.  Patients history was reviewed and there were no contraindications to Nexplanon.  The procedure and minimal risks of pain, bleeding, bruising and infection at the insertion site discussed. Possible irregular menstrual bleeding pattern versus amenorrhea was explained.  No protection against STDs discussed.  Written information provided; all questions answered and patient agrees to proceed.  Consent signed and scanned into computer.      Vitals:    25 1021   BP: 118/84       EXAM:  With patient in supine position the nondominant arm was flexed at the elbow and externally rotated with hand behind her head.  The insertion site was identified 8 cm above the elbow crease at the inner side of the upper arm and 4 cm below the groove between biceps and triceps.  The insertion site was marked and a second donte was placed 6 cm above the first for guidance.    PROCEDURE:  TIME OUT PERFORMED.  The insertion site was prepped with antiseptic and injected with 3 cc of 1% Lidocaine with epinephrine subq along the planned insertion canal.  The insertion sited was then prepped with Betadine.  Using sterile technique the Nexplanon applicator was visually verified and removed from the blister pack.  The Transparent Protection Cap was removed by sliding it horizontally in the direction of the arrow away " from the needle.  The white colored implant was visualized by looking into the tip of the needle.   The needle tip was inserted bevel side up at a 30 degree angle to penetrate the skin.  The applicator was lowered parallel to the arm and the skin was tented with the needle.  While lifting skin with the needle, the needle was inserted to its full length.  Keeping the applicator in place, the purple slider was unlocked by pushing it slightly down.  The slider was then moved fully back until it stopped.  The applicator was then removed. The Needle was noted to be fully retracted and only the purple tip of the obturator was visible.  The implant was palpable beneath the skin after insertion.  The patient was able to palpate the Nexplanon beneath the skin  A steri-strip and then a pressure bandage were placed over the insertion site.  The patient tolerated the procedure well.  EBL: Minimal  Complications: None    ASSESSMENT:  1. Nexplanon insertion    POST NEXPLANON INSERTION COUNSELING:  Manage post nexplanon placement arm pain with NSAIDs or Tylenol.  Keep arm elevated and apply intermittent ice packs to decrease pain and bruising for 24 Hours.  May remove bandage in 24 hours.  Nexplanon danger signs (worsening pain at insertion site, bleeding through bandage, redness and/or pus drainage at insertion site).    Removal in 3 years.

## 2025-06-09 ENCOUNTER — E-VISIT (OUTPATIENT)
Dept: URGENT CARE | Facility: CLINIC | Age: 30
End: 2025-06-09
Payer: COMMERCIAL

## 2025-06-09 DIAGNOSIS — R19.7 DIARRHEA, UNSPECIFIED TYPE: Primary | ICD-10-CM

## 2025-06-09 DIAGNOSIS — R14.0 ABDOMINAL BLOATING: ICD-10-CM

## 2025-06-09 RX ORDER — DICYCLOMINE HYDROCHLORIDE 20 MG/1
20 TABLET ORAL 3 TIMES DAILY PRN
Qty: 45 TABLET | Refills: 0 | Status: SHIPPED | OUTPATIENT
Start: 2025-06-09 | End: 2025-06-24

## 2025-06-09 NOTE — PROGRESS NOTES
Patient ID: Kavita Cho is a 30 y.o. female.        E-Visit Time Tracking:   Day 1 Time (in minutes): 12  Total Time (in minutes): 12      Chief Complaint: General Illness (Entered automatically based on patient selection in [employer].) and GI Problem (With feeling of incomplete BM with diarrha)      The patient initiated a request through [employer] on 6/9/2025 for evaluation and management with a chief complaint of General Illness (Entered automatically based on patient selection in [employer].) and GI Problem (With feeling of incomplete BM with diarrha)     I evaluated the questionnaire submission on 06/09/2025.    [facility] Peq Evisit [facility]group-Common Problems    6/9/2025  2:03 PM CDT - Filed by Patient   What do you need help with? Bowel Movement Problems   Do you agree to participate in an E-Visit? Yes   If you have any of the following symptoms, please present to your local emergency room or call 911:  I acknowledge   Do you have any of the following pregnancy-related conditions? None   What is the main issue you would like addressed today? Having diarrhea a several times a month, typically clears up within 6 hours   Do you have any of the following symptoms? Belly bloating;  Belly pain;  Diarrhea;  Feeling like you didnt finish;  Stool that is watery   Do you have any blood in your stool? No   Have you had unintentional weight loss? No   Did your bowel problem begin after you started a new medication? No   When was your last bowel movement? 5 min ago   How many times per week do you normally have a bowel movement? 3 to 4   I would like to address: Other concerns related to Bowel Movement Problems   Please describe your symptoms. Diarrhea several times a month, usually clears up after a few hours, but has been happening often enough to be disruptive.   On a scale of 1-10, where 10 is the worst you can imagine, how severe are your symptoms? (range: 1 - 10) 4   Have you had these symptoms before? Yes   How long have you  had these symptoms? More than a month   What helps with your symptoms? time, laying down, sometimes imodium   What makes your symptoms feel worse? walking around more, coffee   Are your symptoms related to a condition you currently have? No   Please describe any probable cause for your symptoms. unsure, have been trying to find food connection, but nothing consistent, often corresponds to a day where I am feeling anxious   Provide any additional information you feel is important.    Please attach any relevant images or files    Are you able to take your vital signs? No         Encounter Diagnoses   Name Primary?    Diarrhea, unspecified type Yes    Abdominal bloating         Orders Placed This Encounter   Procedures    Ambulatory referral/consult to Gastroenterology     Referral Priority:   Routine     Referral Type:   Consultation     Referral Reason:   Specialty Services Required     Requested Specialty:   Gastroenterology     Number of Visits Requested:   1      Medications Ordered This Encounter   Medications    dicyclomine (BENTYL) 20 mg tablet     Sig: Take 1 tablet (20 mg total) by mouth 3 (three) times daily as needed (diarrhea and abdominal bloating).     Dispense:  45 tablet     Refill:  0      CMP  Sodium   Date Value Ref Range Status   06/04/2022 136 136 - 145 mmol/L Final     Potassium   Date Value Ref Range Status   06/04/2022 4.7 3.5 - 5.1 mmol/L Final     Comment:     Specimen moderately hemolyzed     Chloride   Date Value Ref Range Status   06/04/2022 100 95 - 110 mmol/L Final     CO2   Date Value Ref Range Status   06/04/2022 20 (L) 23 - 29 mmol/L Final     Glucose   Date Value Ref Range Status   06/04/2022 102 70 - 110 mg/dL Final     BUN   Date Value Ref Range Status   06/04/2022 13 6 - 20 mg/dL Final     Creatinine   Date Value Ref Range Status   06/04/2022 0.9 0.5 - 1.4 mg/dL Final     Calcium   Date Value Ref Range Status   06/04/2022 9.7 8.7 - 10.5 mg/dL Final     Total Protein   Date Value  Ref Range Status   08/21/2020 7.0 6.0 - 8.4 g/dL Final     Albumin   Date Value Ref Range Status   08/21/2020 3.8 3.5 - 5.2 g/dL Final     Total Bilirubin   Date Value Ref Range Status   08/21/2020 0.4 0.1 - 1.0 mg/dL Final     Comment:     For infants and newborns, interpretation of results should be based  on gestational age, weight and in agreement with clinical  observations.  Premature Infant recommended reference ranges:  Up to 24 hours.............<8.0 mg/dL  Up to 48 hours............<12.0 mg/dL  3-5 days..................<15.0 mg/dL  6-29 days.................<15.0 mg/dL       Alkaline Phosphatase   Date Value Ref Range Status   08/21/2020 87 55 - 135 U/L Final     AST   Date Value Ref Range Status   08/21/2020 14 10 - 40 U/L Final     ALT   Date Value Ref Range Status   08/21/2020 21 10 - 44 U/L Final     Anion Gap   Date Value Ref Range Status   06/04/2022 16 8 - 16 mmol/L Final         No recent labs to few   Will refer to GI for workup  Start on meds to see if some improvement  If worsening symptoms recommend in person exam     Follow up in about 2 days (around 6/11/2025), or if symptoms worsen or fail to improve.

## 2025-06-17 ENCOUNTER — OFFICE VISIT (OUTPATIENT)
Dept: GASTROENTEROLOGY | Facility: CLINIC | Age: 30
End: 2025-06-17
Payer: COMMERCIAL

## 2025-06-17 ENCOUNTER — LAB VISIT (OUTPATIENT)
Dept: LAB | Facility: HOSPITAL | Age: 30
End: 2025-06-17
Payer: COMMERCIAL

## 2025-06-17 VITALS
HEIGHT: 70 IN | WEIGHT: 231.94 LBS | SYSTOLIC BLOOD PRESSURE: 118 MMHG | HEART RATE: 106 BPM | DIASTOLIC BLOOD PRESSURE: 82 MMHG | BODY MASS INDEX: 33.21 KG/M2

## 2025-06-17 DIAGNOSIS — K21.9 GASTROESOPHAGEAL REFLUX DISEASE, UNSPECIFIED WHETHER ESOPHAGITIS PRESENT: ICD-10-CM

## 2025-06-17 DIAGNOSIS — R19.7 DIARRHEA, UNSPECIFIED TYPE: ICD-10-CM

## 2025-06-17 DIAGNOSIS — R19.7 DIARRHEA, UNSPECIFIED TYPE: Primary | ICD-10-CM

## 2025-06-17 LAB
ABSOLUTE EOSINOPHIL (OHS): 0.13 K/UL
ABSOLUTE MONOCYTE (OHS): 0.59 K/UL (ref 0.3–1)
ABSOLUTE NEUTROPHIL COUNT (OHS): 3.92 K/UL (ref 1.8–7.7)
ALBUMIN SERPL BCP-MCNC: 3.9 G/DL (ref 3.5–5.2)
ALP SERPL-CCNC: 98 UNIT/L (ref 40–150)
ALT SERPL W/O P-5'-P-CCNC: 24 UNIT/L (ref 10–44)
ANION GAP (OHS): 9 MMOL/L (ref 8–16)
AST SERPL-CCNC: 13 UNIT/L (ref 11–45)
BASOPHILS # BLD AUTO: 0.04 K/UL
BASOPHILS NFR BLD AUTO: 0.6 %
BILIRUB SERPL-MCNC: 0.3 MG/DL (ref 0.1–1)
BUN SERPL-MCNC: 10 MG/DL (ref 6–20)
CALCIUM SERPL-MCNC: 8.7 MG/DL (ref 8.7–10.5)
CHLORIDE SERPL-SCNC: 104 MMOL/L (ref 95–110)
CO2 SERPL-SCNC: 26 MMOL/L (ref 23–29)
CREAT SERPL-MCNC: 0.8 MG/DL (ref 0.5–1.4)
ERYTHROCYTE [DISTWIDTH] IN BLOOD BY AUTOMATED COUNT: 13 % (ref 11.5–14.5)
GFR SERPLBLD CREATININE-BSD FMLA CKD-EPI: >60 ML/MIN/1.73/M2
GLUCOSE SERPL-MCNC: 92 MG/DL (ref 70–110)
HCT VFR BLD AUTO: 44.2 % (ref 37–48.5)
HGB BLD-MCNC: 14.4 GM/DL (ref 12–16)
IMM GRANULOCYTES # BLD AUTO: 0.02 K/UL (ref 0–0.04)
IMM GRANULOCYTES NFR BLD AUTO: 0.3 % (ref 0–0.5)
LYMPHOCYTES # BLD AUTO: 2.54 K/UL (ref 1–4.8)
MCH RBC QN AUTO: 28.1 PG (ref 27–31)
MCHC RBC AUTO-ENTMCNC: 32.6 G/DL (ref 32–36)
MCV RBC AUTO: 86 FL (ref 82–98)
NUCLEATED RBC (/100WBC) (OHS): 0 /100 WBC
PLATELET # BLD AUTO: 295 K/UL (ref 150–450)
PMV BLD AUTO: 10.1 FL (ref 9.2–12.9)
POTASSIUM SERPL-SCNC: 4.4 MMOL/L (ref 3.5–5.1)
PROT SERPL-MCNC: 7 GM/DL (ref 6–8.4)
RBC # BLD AUTO: 5.12 M/UL (ref 4–5.4)
RELATIVE EOSINOPHIL (OHS): 1.8 %
RELATIVE LYMPHOCYTE (OHS): 35.1 % (ref 18–48)
RELATIVE MONOCYTE (OHS): 8.1 % (ref 4–15)
RELATIVE NEUTROPHIL (OHS): 54.1 % (ref 38–73)
SODIUM SERPL-SCNC: 139 MMOL/L (ref 136–145)
WBC # BLD AUTO: 7.24 K/UL (ref 3.9–12.7)

## 2025-06-17 PROCEDURE — 99999 PR PBB SHADOW E&M-EST. PATIENT-LVL IV: CPT | Mod: PBBFAC,,,

## 2025-06-17 PROCEDURE — 99203 OFFICE O/P NEW LOW 30 MIN: CPT | Mod: S$GLB,,,

## 2025-06-17 PROCEDURE — 82040 ASSAY OF SERUM ALBUMIN: CPT

## 2025-06-17 PROCEDURE — 36415 COLL VENOUS BLD VENIPUNCTURE: CPT

## 2025-06-17 PROCEDURE — 1159F MED LIST DOCD IN RCRD: CPT | Mod: CPTII,S$GLB,,

## 2025-06-17 PROCEDURE — 3079F DIAST BP 80-89 MM HG: CPT | Mod: CPTII,S$GLB,,

## 2025-06-17 PROCEDURE — 85025 COMPLETE CBC W/AUTO DIFF WBC: CPT

## 2025-06-17 PROCEDURE — 3008F BODY MASS INDEX DOCD: CPT | Mod: CPTII,S$GLB,,

## 2025-06-17 PROCEDURE — 1160F RVW MEDS BY RX/DR IN RCRD: CPT | Mod: CPTII,S$GLB,,

## 2025-06-17 PROCEDURE — 3074F SYST BP LT 130 MM HG: CPT | Mod: CPTII,S$GLB,,

## 2025-06-17 PROCEDURE — 86258 DGP ANTIBODY EACH IG CLASS: CPT

## 2025-06-17 NOTE — PATIENT INSTRUCTIONS
[facility]  High Fiber Diet    20-30 grams of fiber per day is recommended    Fiber cereal = 5 grams (Raisin Bran, Shredded Wheat, Grape Nuts)  Konsyl 1 teaspoon = 6 grams  Metamucil 1 tablespoon = 3 grams  Citrucel 1 tablespoon = 2 grams  Fiber Choice = 3-4 per day    Drink at least 4-5 glasses of liquids per day or the fiber can be constipating rather then stimulating to your gut.  Boil and bake potatoes in their skin. Eat the skin, too.  Include fresh fruits and raw vegetables in your daily diet. Raw fruits and vegetables have more useful fiber than those that have been peeled, cooked, pureed, or otherwise processed.  Eat a wide variety of fibrous foods in reasonable amounts. Increase fiber intake slowly especially if you have been on a low-fiber diet.  Eat more legumes-peas, beans, soybeans.  For snacks, try dried fruit, whole wheat and rye crackers.  Avoid instant-cook hot cereals. Use the longer cooking cereals.  Use bran whenever possible. Sprinkle it on top of cereal, mix it into mashed potatoes or hamburger meat, or use it in combination dishes such as meat loaf.   Substitute whole grain, whole wheat and bran products for white flour products.  Eat slowly and chew your food thoroughly.    Psyllium has been shown to improve both constipation and diarrhea. Fiber may increase bulk of stool and may also include alterations in the production of gaseous fermentation products and changes to the gut microbiome. As some patients may experience increased bloating and gas, we suggest a low starting dose of psyllium that provides approximately 3 to 4 grams of soluble fiber per day. The soluble fiber content of psyllium products (ie, per packet, teaspoon, or pill) varies widely; refer to product-specific label to determine dose. The dose should then be slowly titrated up based on response to treatment.     Foods High in Fiber    This diet furnishes adequate amounts of all the essential nutrients needed  by the body and a very liberal fiber or roughage content. Roughage is indigestible fiber found in fruits, vegetables and whole grain cereals. It provides bulk to the large intestine and, accompanied by an adequate fluid intake, is a stimulant to elimination. Regular eating and elimination habits are vital to good health.     Fruits:  Use all fruits and juices liberally; fresh, cooked, dried or canned. Eat fruit raw and with skins when possible. Have at least four servings of fruit daily including a citrus fruit and a stewed dried fruit. Hard seeds of fruits (berries, figs, Grapes, mangoes, tomatoes) etc. may be removed.    Vegetables: Use all vegetables liberally. Green leafy vegetables, such as cabbage, spinach, lettuce, broccoli, and other greens are particularly good.    Potato: As desired. Serve baked frequently and eat the skin. Other starchy foods such as rice, macaroni, etc., may be occasionally substituted. Chew popcorn well and do not eat hard kernels.    Meat, Fish, Poultry: One or two servings daily.    Eggs: One daily if you are not on a low cholesterol diet.    Milk: Include at least one-half pint daily. Whole milk or skimmed may be used.     Cereals: Use whole grain breads and cereals such as bran, bran flakes, grape nut flakes, puffed wheat, oatmeal, Wheaties, etc., as much as possible. Refined breaks and cereals are not restricted; however, they do not contain the bulk necessary for this diet.     Sugars, Sweets: Use very moderately. Depend on fruit as dessert.    Fats: Use butter or margarine as desired. Oil or dressing on salads as desired. Fried foods may be used in moderation. Nuts may be eaten if you chew them well and may be ground or finely chopped for cooking.   Sample Menu                                                                                 Breakfast                          Lunch  Orange juice, 4 ounces                                                Vegetable soup                     Stewed fruit                                                                 Fresh fruit plate with cottage cheese  Shredded wheat                                                           Whole wheat toast  Scrambled eggs                                                           Butter  Whole wheat toast                                                       Coffee or tea  Dinner                                                                         Bedtime  Large glass tomato juice                                             1 glass milk  Roast beef                                                                   stewed fruit  Baked potato with skin  Buttered spinach  Raw vegetable salad  Baked apple with skin   Coffee or tea

## 2025-06-17 NOTE — PROGRESS NOTES
Gastroenterology Clinic Consultation Note    Patient ID: Kavita Cho is a 30 y.o. female.        Chief Complaint: Diarrhea    CHIEF COMPLAINT:  Patient presents today for intermittent diarrhea    GASTROINTESTINAL SYMPTOMS:  Referred by urgent care for diarrhea.  She reports episodes of diarrhea occurring every 3-4 weeks for the past year, with the most recent episode on Saturday. Episodes last 4-6 hours with bowel movements every 20 minutes, progressing from normal to watery, foul-smelling stools. Episodes typically resolve within a day with return to normal bowel habits the following day. She occasionally takes Imodium for symptom relief. Episodes correlate with increased stress, though specific triggers beyond this association have not been identified. She denies blood in stools, nausea, vomiting, abdominal pain or unintentional weight loss. She denies family history of GI cancers.  She is tachycardic today, she states that she is nervous.    GERD:  She experiences almost daily throat burning from reflux. Tums provide incomplete symptom relief.  She does drink a lot of caffeine. She denies difficulty swallowing, painful swallowing, hematemesis, or sensation of food becoming stuck in chest or throat.      ROS:  Review of Systems   Constitutional:  Negative for chills and fever.   Respiratory:  Negative for cough and shortness of breath.    Cardiovascular:  Negative for chest pain.   Gastrointestinal:  Positive for diarrhea. Negative for abdominal pain, blood in stool, constipation, heartburn, melena, nausea and vomiting.   Skin:  Negative for rash.   Neurological:  Negative for seizures, loss of consciousness and weakness.        Medical History:  has a past medical history of Asthma and Migraine.    Surgical History:  has a past surgical history that includes [place] tooth extraction.    Family History: family history includes Breast cancer in her maternal aunt; Diabetes in her maternal aunt;  "Hypertension in her father; Insomnia in her mother; Irregular heart beat in her mother; Migraines in her maternal aunt, maternal grandmother, maternal uncle, and mother..       Review of patient's allergies indicates:   Allergen Reactions    Fremanezumab-vfrm     Galcanezumab-gnlm        Medications Ordered Prior to Encounter[1]    Labs:  Lab Results   Component Value Date    WBC 14.42 (H) 06/04/2022    HGB 16.1 (H) 06/04/2022    HCT 46.4 06/04/2022     06/04/2022    CHOL 169 08/21/2020    TRIG 117 08/21/2020    HDL 35 (L) 08/21/2020    ALKPHOS 87 08/21/2020    ALT 21 08/21/2020    AST 14 08/21/2020     06/04/2022    K 4.7 06/04/2022     06/04/2022    CREATININE 0.9 06/04/2022    BUN 13 06/04/2022    CO2 20 (L) 06/04/2022       Vital Signs:  /82 (Patient Position: Sitting)   Pulse 106   Ht 5' 10" (1.778 m)   Wt 105.2 kg (231 lb 14.8 oz)   LMP 05/20/2025   BMI 33.28 kg/m²   Body mass index is 33.28 kg/m².    Physical Exam:  Physical Exam  Vitals and nursing note reviewed.   Constitutional:       General: She is not in acute distress.     Appearance: Normal appearance. She is not ill-appearing.   HENT:      Head: Normocephalic and atraumatic.   Eyes:      Extraocular Movements: Extraocular movements intact.   Cardiovascular:      Rate and Rhythm: Regular rhythm. Tachycardia present.   Pulmonary:      Effort: Pulmonary effort is normal. No respiratory distress.   Abdominal:      General: Bowel sounds are normal. There is no distension.      Palpations: Abdomen is soft.      Tenderness: There is no abdominal tenderness.   Neurological:      General: No focal deficit present.      Mental Status: She is alert and oriented to person, place, and time.   Psychiatric:         Mood and Affect: Mood normal.         Behavior: Behavior normal.         Imaging reviewed: CT abdomen Pelvis 06/04/2022  FINDINGS:  This examination is limited due to lack of intravenous contrast.     Lower chest: Minor " dependent atelectasis.     Liver: Normal contour.     Gallbladder and bile ducts: Unremarkable.     Pancreas: Normal contour.     Spleen: Normal contour.     Adrenals: Normal contour.     Kidneys: Moderate right-sided hydronephrosis and hydroureter, which appears related to obstructing 2 mm calculus in the distal ureter in the vicinity of the UVJ.  There is perinephric and periureteral inflammatory change.     No additional radiopaque urolithiasis is seen.  No findings of left-sided obstructive uropathy appreciated.     Lymph nodes: No abdominal or pelvic lymphadenopathy.     Bowel and mesentery: No definite evidence of acute bowel obstruction.  Colonic diverticulosis is noted without definite evidence of acute diverticulitis at the present time.  Normal appearing candidate appendix is suggested.     Abdominal aorta: Unremarkable.     Inferior vena cava: Unremarkable.     Free fluid or free air: None.     Pelvis: Unremarkable.     Urinary bladder: Unremarkable.     Body wall: Unremarkable.     Bones: No acute findings.     Impression:     1. Moderate right-sided hydronephrosis and hydroureter, related to obstructing 2 mm calculus distal ureter in the vicinity of the UVJ.  2. Additional details, as provided in the body of report.        Electronically signed by:Juan David Mueller  Date:                                            06/04/2022  Time:                                           08:11    Endoscopy reviewed: No previous endoscopies    Assessment & Plan  1. Diarrhea, unspecified type    2. Gastroesophageal reflux disease, unspecified whether esophagitis present      Orders Placed This Encounter    Clostridium difficile EIA    Stool culture    H. pylori antigen, stool    Pancreatic elastase, fecal    Fecal fat, qualitative    Calprotectin, Stool    Giardia / Cryptosporidum, EIA    Stool Exam-Ova,Cysts,Parasites    CBC W/ AUTO DIFFERENTIAL    Comprehensive Metabolic Panel    Celiac Disease Panel     DIARRHEA:  -  Suspect IBS due to stress-related diarrhea episodes occurring every 3-4 weeks.  - Explained brain-gut interaction in stress-related IBS symptoms.  - Patient to work on stress management techniques to potentially reduce IBS symptoms.  - Started OTC fiber supplement (such as Metamucil or Benefiber) to be taken daily mixed with water, with increased water intake advised. Informed about initial bloating when starting fiber supplementation, which should improve with continued use.  - Continued OTC Imodium as needed for diarrhea episodes.  - Consider possible celiac disease as differential diagnosis.  - Ordered celiac disease test.  - Ordered stool studies to rule out infectious, inflammatory, pathogenic, or parasitic causes of diarrhea.    GASTROESOPHAGEAL REFLUX DISEASE:  - Discussed and recommended avoiding common reflux triggers including caffeine, spicy foods, red sauces, peppermint, chocolate, and energy drinks.  - Started OTC Pepcid for reflux symptoms, to be taken daily or as needed.  - Ordered H. pylori test as part of stool studies given reflux symptoms.    DIAGNOSTIC TESTS:  - Ordered CBC and CMP and Celiac Panel    FOLLOW-UP:  - Follow up in a few weeks or a month to assess effectiveness of current treatment plan and discuss next steps if symptoms persist.  - Contact the office via patient portal if any questions arise before the follow-up visit.        TOYA BORREGO, LANE-C  Gastroenterology Department  [facility] [address]  [phone]     This note was generated with the assistance of ambient listening technology. Verbal consent was obtained by the patient and accompanying visitor(s) for the recording of patient appointment to facilitate this note. I attest to having reviewed and edited the generated note for accuracy, though some syntax or spelling errors may persist. Please contact the author of this note for any clarification.          [1]   Current Outpatient Medications on File Prior  to Visit   Medication Sig Dispense Refill    AIMOVIG AUTOINJECTOR 140 mg/mL autoinjector SMARTSI Milliliter(s) SUB-Q Every 4 Weeks      dicyclomine (BENTYL) 20 mg tablet Take 1 tablet (20 mg total) by mouth 3 (three) times daily as needed (diarrhea and abdominal bloating). 45 tablet 0    EPINEPHrine (EPIPEN 2-GARCÍA) 0.3 mg/0.3 mL AtIn Inject 0.3 mLs (0.3 mg total) into the muscle once. for 1 dose 2 Device 0    naproxen (NAPROSYN) 500 MG tablet Take 1 tablet (500 mg total) by mouth 2 (two) times daily as needed (pain). 60 tablet 0    nortriptyline (PAMELOR) 50 MG capsule nortriptyline 50 mg capsule   Take 1 capsule every day by oral route at bedtime.      sumatriptan (IMITREX) 100 MG tablet sumatriptan 100 mg tablet   TAKE 1 TABLET BY MOUTH AT ONSET OF HEADACHE, MAY REPEAT 2 HOURS LATER. MAX DOSE 2 TABS/24HOURS      UBROGEPANT 100 mg tablet Take 100 mg by mouth 2 (two) times daily as needed.       No current facility-administered medications on file prior to visit.

## 2025-06-19 ENCOUNTER — RESULTS FOLLOW-UP (OUTPATIENT)
Dept: GASTROENTEROLOGY | Facility: CLINIC | Age: 30
End: 2025-06-19
Payer: COMMERCIAL

## 2025-06-19 LAB
W GLIADIN AB IGA, DEAMIDATED: 1 U/ML
W GLIADIN AB IGG, DEAMIDATED: 2.2 U/ML
W IMMUNOGLOBULIN A (IGA): 195 MG/DL
W TISSUE TRANSGLUTAMINASE IGA AB: 0.5 U/ML
W TISSUE TRANSGLUTAMINASE IGG: 1.2 U/ML

## 2025-06-23 ENCOUNTER — LAB VISIT (OUTPATIENT)
Dept: LAB | Facility: HOSPITAL | Age: 30
End: 2025-06-23
Payer: COMMERCIAL

## 2025-06-23 DIAGNOSIS — R19.7 DIARRHEA, UNSPECIFIED TYPE: ICD-10-CM

## 2025-06-23 PROCEDURE — 87338 HPYLORI STOOL AG IA: CPT

## 2025-06-23 PROCEDURE — 87329 GIARDIA AG IA: CPT

## 2025-06-23 PROCEDURE — 87427 SHIGA-LIKE TOXIN AG IA: CPT | Mod: 59

## 2025-06-23 PROCEDURE — 87045 FECES CULTURE AEROBIC BACT: CPT

## 2025-06-23 PROCEDURE — 87046 STOOL CULTR AEROBIC BACT EA: CPT | Mod: 59

## 2025-06-23 PROCEDURE — 87209 SMEAR COMPLEX STAIN: CPT

## 2025-06-23 PROCEDURE — 82653 EL-1 FECAL QUANTITATIVE: CPT

## 2025-06-23 PROCEDURE — 87324 CLOSTRIDIUM AG IA: CPT

## 2025-06-23 PROCEDURE — 83993 ASSAY FOR CALPROTECTIN FECAL: CPT

## 2025-06-23 PROCEDURE — 82705 FATS/LIPIDS FECES QUAL: CPT

## 2025-06-24 LAB
C COLI+JEJ+UPSA DNA STL QL NAA+NON-PROBE: NEGATIVE
C DIFF GDH STL QL: NEGATIVE
C DIFF TOX A+B STL QL IA: NEGATIVE
CALPROTECTIN INTERP (OHS): ABNORMAL
CALPROTECTIN STOOL (OHS): 52.3 ΜG/G
CRYPTOSP AG SPEC QL: NEGATIVE
E COLI SXT1 STL QL IA: NEGATIVE
E COLI SXT2 STL QL IA: NEGATIVE
ELASTASE, STOOL INTERPRETATION (OHS): NORMAL
G LAMBLIA AG STL QL IA: NEGATIVE
H. PYLORI SURFACE ANTIGEN, INTERPRETATION (OHS): NEGATIVE
HELICOBACTER PYLORI SURFACE ANTIGEN (OHS): 0.14
PANCREATIC ELASTASE, FECAL (OHS): >800 ΜG/G

## 2025-06-25 LAB
FAT STL QL: ABNORMAL
NEUTRAL FAT STL QL: NORMAL

## 2025-06-26 LAB — BACTERIA STL CULT: NORMAL

## 2025-06-28 LAB — O+P STL MICRO: NORMAL
